# Patient Record
Sex: FEMALE | Race: WHITE | NOT HISPANIC OR LATINO | Employment: FULL TIME | URBAN - NONMETROPOLITAN AREA
[De-identification: names, ages, dates, MRNs, and addresses within clinical notes are randomized per-mention and may not be internally consistent; named-entity substitution may affect disease eponyms.]

---

## 2017-01-04 ENCOUNTER — OFFICE VISIT (OUTPATIENT)
Dept: PULMONOLOGY | Facility: CLINIC | Age: 52
End: 2017-01-04

## 2017-01-04 ENCOUNTER — HOSPITAL ENCOUNTER (OUTPATIENT)
Dept: LAB | Facility: HOSPITAL | Age: 52
Discharge: HOME | End: 2017-01-04
Attending: INTERNAL MEDICINE

## 2017-01-04 VITALS
OXYGEN SATURATION: 95 % | BODY MASS INDEX: 31.02 KG/M2 | HEART RATE: 70 BPM | SYSTOLIC BLOOD PRESSURE: 118 MMHG | DIASTOLIC BLOOD PRESSURE: 66 MMHG | RESPIRATION RATE: 18 BRPM | WEIGHT: 158 LBS | HEIGHT: 60 IN

## 2017-01-04 DIAGNOSIS — R06.02 SHORTNESS OF BREATH: Primary | ICD-10-CM

## 2017-01-04 DIAGNOSIS — J42 CHRONIC BRONCHITIS, UNSPECIFIED CHRONIC BRONCHITIS TYPE (HCC): ICD-10-CM

## 2017-01-04 DIAGNOSIS — G47.33 OBSTRUCTIVE SLEEP APNEA: ICD-10-CM

## 2017-01-04 DIAGNOSIS — J30.89 OTHER ALLERGIC RHINITIS: ICD-10-CM

## 2017-01-04 DIAGNOSIS — J44.9 CHRONIC OBSTRUCTIVE PULMONARY DISEASE, UNSPECIFIED COPD TYPE (HCC): ICD-10-CM

## 2017-01-04 DIAGNOSIS — R93.89 ABNORMAL CHEST X-RAY: ICD-10-CM

## 2017-01-04 PROCEDURE — 99245 OFF/OP CONSLTJ NEW/EST HI 55: CPT | Performed by: INTERNAL MEDICINE

## 2017-01-04 RX ORDER — FLUNISOLIDE 0.25 MG/ML
1 SOLUTION NASAL EVERY 12 HOURS
Qty: 1 BOTTLE | Refills: 5 | Status: SHIPPED | OUTPATIENT
Start: 2017-01-04 | End: 2017-08-09 | Stop reason: SDUPTHER

## 2017-01-04 RX ORDER — OMEPRAZOLE 20 MG/1
CAPSULE, DELAYED RELEASE ORAL
Refills: 0 | COMMUNITY
Start: 2016-12-28 | End: 2018-01-22

## 2017-01-04 RX ORDER — HYDROCODONE BITARTRATE AND ACETAMINOPHEN 7.5; 325 MG/1; MG/1
TABLET ORAL
Refills: 0 | COMMUNITY
Start: 2016-12-28 | End: 2018-01-22 | Stop reason: SDUPTHER

## 2017-01-04 RX ORDER — LEVOTHYROXINE SODIUM 0.07 MG/1
TABLET ORAL
Refills: 0 | COMMUNITY
Start: 2016-12-28 | End: 2020-09-09 | Stop reason: DRUGHIGH

## 2017-01-04 RX ORDER — GABAPENTIN 600 MG/1
TABLET ORAL
Refills: 0 | COMMUNITY
Start: 2016-12-29 | End: 2018-01-22

## 2017-01-04 RX ORDER — ALBUTEROL SULFATE 2.5 MG/3ML
SOLUTION RESPIRATORY (INHALATION)
COMMUNITY
Start: 2014-02-19

## 2017-01-04 RX ORDER — SIMVASTATIN 20 MG
20 TABLET ORAL NIGHTLY
Refills: 0 | COMMUNITY
Start: 2016-12-28

## 2017-01-04 RX ORDER — ACLIDINIUM BROMIDE 400 UG/1
POWDER, METERED RESPIRATORY (INHALATION)
Refills: 0 | COMMUNITY
Start: 2016-12-28 | End: 2017-01-04 | Stop reason: SDUPTHER

## 2017-01-04 RX ORDER — MOMETASONE FUROATE AND FORMOTEROL FUMARATE DIHYDRATE 200; 5 UG/1; UG/1
AEROSOL RESPIRATORY (INHALATION)
Refills: 0 | COMMUNITY
Start: 2016-11-06 | End: 2018-01-22

## 2017-01-04 RX ORDER — ACLIDINIUM BROMIDE 400 UG/1
1 POWDER, METERED RESPIRATORY (INHALATION)
Qty: 1 EACH | Refills: 5 | Status: SHIPPED | OUTPATIENT
Start: 2017-01-04 | End: 2017-09-19

## 2017-01-04 NOTE — MR AVS SNAPSHOT
Sarita Horan   1/4/2017 10:15 AM   Office Visit    Dept Phone:  [phone]   Encounter #:  [id number]    Provider:  Bianca Huang MD   Department:  [facility] PULMONARY CRITICAL CARE AND SLEEP                Your Full Care Plan              Today's Medication Changes          These changes are accurate as of: 1/4/17 10:50 AM.  If you have any questions, ask your nurse or doctor.               New Medication(s)Ordered:     flunisolide 25 MCG/ACT (0.025%) solution nasal spray   Commonly known as:  NASALIDE   Inhale 1 spray Every 12 (Twelve) Hours.   Started by:  Bianca Huang MD         Medication(s)that have changed:     TUDORZA PRESSAIR 400 MCG/ACT aerosol powder  powder for inhalation   Generic drug:  aclidinium bromide   Inhale 1 puff 2 (Two) Times a Day.   What changed:  See the new instructions.   Changed by:  Bianca Huang MD            Where to Get Your Medications      These medications were sent to [facility] [place] [address] - [address] [place] [address] - [phone]  - [phone] [address] [address]     Phone:  [phone]     flunisolide 25 MCG/ACT (0.025%) solution nasal spray    TUDORZA PRESSAIR 400 MCG/ACT aerosol powder  powder for inhalation                  Your Updated Medication List          This list is accurate as of: 1/4/17 10:50 AM.  Always use your most recent med list.                * albuterol (2.5 MG/3ML) 0.083% nebulizer solution   Commonly known as:  PROVENTIL       * PROAIR  (90 BASE) MCG/ACT inhaler   Generic drug:  albuterol       DULERA 200-5 MCG/ACT inhaler   Generic drug:  mometasone-formoterol       flunisolide 25 MCG/ACT (0.025%) solution nasal spray   Commonly known as:  NASALIDE   Inhale 1 spray Every 12 (Twelve) Hours.       gabapentin 600 MG tablet   Commonly known as:  NEURONTIN       HYDROcodone-acetaminophen 7.5-325 MG per tablet   Commonly known as:  NORCO       levothyroxine 75 MCG tablet   Commonly known as:  SYNTHROID, LEVOTHROID       metFORMIN 500 MG tablet   Commonly known as:  GLUCOPHAGE       omeprazole 20 MG capsule   Commonly known as:  priLOSEC       simvastatin 20 MG tablet   Commonly known as:  ZOCOR       TUDORZA PRESSAIR 400 MCG/ACT aerosol powder  powder for inhalation   Generic drug:  aclidinium bromide   Inhale 1 puff 2 (Two) Times a Day.       * Notice:  This list has 2 medication(s) that are the same as other medications prescribed for you. Read the directions carefully, and ask your doctor or other care provider to review them with you.            You Were Diagnosed With        Codes Comments    Shortness of breath    -  Primary ICD-10-CM: R06.02  ICD-9-CM: 786.05     Chronic obstructive pulmonary disease, unspecified COPD type     ICD-10-CM: J44.9  ICD-9-CM: 496     Chronic bronchitis, unspecified chronic bronchitis type     ICD-10-CM: J42  ICD-9-CM: 491.9     Abnormal chest x-ray     ICD-10-CM: R93.8  ICD-9-CM: 793.2     Obstructive sleep apnea     ICD-10-CM: G47.33  ICD-9-CM: 327.23     Other allergic rhinitis     ICD-10-CM: J30.89  ICD-9-CM: 477.8       Instructions     None    Patient Instructions History      Upcoming Appointments     Visit Type Date Time Department    NEW PATIENT 2017 10:15 AM [facility]    FOLLOW UP 3/16/2017 11:15 AM [facility]      eGym Signup     [facility] eGym allows you to send messages to your doctor, view your test results, renew your prescriptions, schedule appointments, and more. To sign up, go to "[employer] and click on the Sign Up Now link in the New User? box. Enter your eGym Activation Code exactly as it appears below along with the last four digits of your Social Security Number and your Date of Birth () to complete the sign-up process. If you do not sign up before the expiration date, you must request a new code.    eGym Activation Code:  [email or web address]  Expires: 1/18/2017 10:50 AM    If you have questions, you can email [email or web address] or call [phone] to talk to our [employer] staff. Remember, [employer] is NOT to be used for urgent needs. For medical emergencies, dial 911.               Other Info from Your Visit           Your Appointments     Mar 16, 2017 11:15 AM EDT   Follow Up with Bianca Huang MD   [facility] PULMONARY [facility]ITICAL [facility]ARE AND SLEEP (--)    [address] [facility] [address]  [facility] [address] [phone]           Arrive 15 minutes prior to appointment.              Allergies     Ibuprofen      Rocephin [Ceftriaxone]        Reason for Visit     COPD     Lung Nodule POSSIBLY      Vital Signs     Blood Pressure Pulse Respirations Height Weight Oxygen Saturation    118/66 70 18 60\" (152.4 cm) 158 lb (71.7 kg) 95%    Body Mass Index Smoking Status                30.86 kg/m2 Former Smoker          Problems and Diagnoses Noted     Shortness of breath    -  Primary    Chronic airway obstruction        Chronic bronchitis        Abnormal chest x-ray        Sleep apnea        Other allergic rhinitis            "

## 2017-01-04 NOTE — LETTER
January 8, 2017     LEX Diallo  [address]  [facility] [place] [address]    Patient: Sarita Horan   YOB: 1965   Date of Visit: 1/4/2017       Dear LEX Campos:    Sarita Horan was in my office today. Below is a copy of my note.    If you have questions, please do not hesitate to call me. I look forward to following Sarita along with you.         Sincerely,        Bianca Huang MD        CC: No Recipients    CONSULT NOTE    Chief Complaint   Patient presents with   • COPD   • Lung Nodule     POSSIBLY       Subjective   Sarita Horan is a 51 y.o. female.     History of Present Illness   Patient comes in today for evaluation of shortness of breath. Patient says that for the past few years, she has been noticing that her exercise tolerance has been declining. She has had days when walking any significant distance is difficult to do without stopping in the middle, to catch her breath.     Patient also complains of some cough and phlegm production that occurs on most mornings out of the week, for most weeks out of the month, for the past few years. Patient used to smoke 1 pack per day for the past 34 years and is not smoking at the current time, having quit 3 weeks ago.     She does have a family history of chronic obstructive disease in her father.  She says that both her parents smoked inside the house.  She also reports having asthma as a child    She does have a pet dog at home    Patient also complains of runny nose and dribbling in the back of the throat for the past few months. She is currently on Tudorza once a day, Dulera twice a day and still requires the pro-air at least twice a day as well    Upon questioning he is also complaining of sleep disturbance. Patient says that for the past few years, she has had trouble with snoring. Patient says that she feels tired in the morning after waking up.  She is complaining of occasional headaches.    She actually was diagnosed with  "obstructive sleep apnea but her CPAP device has malfunctioned and she has been unable to use CPAP device on a regular basis      The following portions of the patient's history were reviewed and updated as appropriate: allergies, current medications, past family history, past medical history, past social history and past surgical history.    Review of Systems   HENT: Positive for voice change.    Respiratory: Positive for cough, shortness of breath and wheezing.    Psychiatric/Behavioral: Positive for sleep disturbance.   All other systems reviewed and are negative.      Past Medical History   Diagnosis Date   • Asthma    • Chronic bronchitis    • COPD (chronic obstructive pulmonary disease)    • Hyperlipidemia    • Pneumonia    • Sleep apnea, obstructive        Social History   Substance Use Topics   • Smoking status: Former Smoker     Packs/day: 1.00     Years: 34.00     Quit date: 12/14/2016   • Smokeless tobacco: Not on file   • Alcohol use No         Objective   Visit Vitals   • /66   • Pulse 70   • Resp 18   • Ht 60\" (152.4 cm)   • Wt 158 lb (71.7 kg)   • SpO2 95%   • BMI 30.86 kg/m2     Physical Exam  Constitutional:           General: No acute distress noted. Able to communicate appropriately           Body Habitus: Obese.    Head/Face/Eyes:            Atraumatic. Normocephalic. No significant facial abnormalities seen.            Extra ocular movement was intact.            Pupils appeared equal            Conjunctivae: Normal     ENT:            Sinuses were non tender on palpation.            Nostril showed moderate erythema.            Oropharynx was cobblestoned.            Mallampati score was 3/4            Tonsils were enlarged     Neck:           Increased adipose tissue noted. No Jugular Venous Distention appreciated.     Cardiovascular:            S1 + S2. Regular.    Respiratory:           Respiratory effort was adequate           Percussion was normal           Mild scattered wheezing was " auscultated.    Musculoskeletal/Extremities:             Normal Gait.              No clubbing, cyanosis noted in the upper extremities.             No edema noted in the lower extremities bilaterally.    Neurologic/Psychiatric:             Affect appeared fair.                Awake, alert and oriented x 3.             Able to follow simple commands.    Skin:             No obvious rash noted.             Warm and dry      Assessment/Plan   Sarita was seen today for copd and lung nodule.    Diagnoses and all orders for this visit:    Shortness of breath  -     XR Chest PA & Lateral; Future    Chronic obstructive pulmonary disease, unspecified COPD type  -     Alpha - 1 - Antitrypsin Phenotype; Future  -     XR Chest PA & Lateral; Future  -     Pulmonary Function Test; Future    Chronic bronchitis, unspecified chronic bronchitis type    Abnormal chest x-ray    Obstructive sleep apnea  -     Polysomnography 4 or More Parameters; Future    Other allergic rhinitis  -     IgE; Future  -     Allergens, Zone 8; Future    Other orders  -     TUDORZA PRESSAIR 400 MCG/ACT aerosol powder  powder for inhalation; Inhale 1 puff 2 (Two) Times a Day.  -     flunisolide (NASALIDE) 25 MCG/ACT (0.025%) solution nasal spray; Inhale 1 spray Every 12 (Twelve) Hours.           Return in about 2 months (around 3/4/2017) for Sleep study, PFTs, Labs, Imaging study, Give pt sleep questionairre.    DISCUSSION(if any):  I have reviewed the patient's imaging studies and shared them with her.  The images were reviewed with the patient and it does suggest possible right middle lobe/perihilar scarring/atelectasis.    I have also reviewed her last primary care provider's office note that mentions upper respiratory tract infection, acute bronchitis and COPD.  And also mentions that the sputum culture was ordered     Laboratory workup was also reviewed which showed last hemoglobin of around 11    ================================   ================================    I will try to obtain the patient's last sleep study but since she has not used the CPAP device in many months, and the sleep study was done a few years ago, the only real alternative would be to order a split night study and this will be requested as soon as possible.    We will encourage the patient to schedule the sleep study soon.     Patient was educated on good sleep hygiene measures and voiced understanding of the same.     Some of her issues with CPAP earlier, could be due to the fact that she likely has underlying COPD which may have made exhaling against the CPAP difficult.  I have told her that if that occurs, then we will definitely suggest a trial of BiPAP    She was asked to use Tudorza twice a day and not once a day    Patient was advised to use albuterol based rescue inhaler for when necessary purposes    Patient was also advised to keep a log of the use of rescue inhaler.    She is congratulated on the fact that she quit smoking and have advised her to stay quit    Patient was given reading material regarding sleep apnea, smoking cessation and COPD    Alpha-1 antitrypsin levels, PFTs, chest x-ray and IgE/RAST panel has been ordered    Nasal steroids have been initiated, due to presence of allergic rhinitis        Errors in dictation may reflect use of voice recognition software and not all errors in transcription may have been detected prior to signing    This document was electronically signed by Bianca Huang MD January 4, 2017  10:47 AM

## 2017-01-04 NOTE — PROGRESS NOTES
CONSULT NOTE    Chief Complaint   Patient presents with   • COPD   • Lung Nodule     POSSIBLY       Subjective   Sarita Myra Horan is a 51 y.o. female.     History of Present Illness   Patient comes in today for evaluation of shortness of breath. Patient says that for the past few years, she has been noticing that her exercise tolerance has been declining. She has had days when walking any significant distance is difficult to do without stopping in the middle, to catch her breath.     Patient also complains of some cough and phlegm production that occurs on most mornings out of the week, for most weeks out of the month, for the past few years. Patient used to smoke 1 pack per day for the past 34 years and is not smoking at the current time, having quit 3 weeks ago.     She does have a family history of chronic obstructive disease in her father.  She says that both her parents smoked inside the house.  She also reports having asthma as a child    She does have a pet dog at home    Patient also complains of runny nose and dribbling in the back of the throat for the past few months. She is currently on Tudorza once a day, Dulera twice a day and still requires the pro-air at least twice a day as well    Upon questioning he is also complaining of sleep disturbance. Patient says that for the past few years, she has had trouble with snoring. Patient says that she feels tired in the morning after waking up.  She is complaining of occasional headaches.    She actually was diagnosed with obstructive sleep apnea but her CPAP device has malfunctioned and she has been unable to use CPAP device on a regular basis      The following portions of the patient's history were reviewed and updated as appropriate: allergies, current medications, past family history, past medical history, past social history and past surgical history.    Review of Systems   HENT: Positive for voice change.    Respiratory: Positive for cough, shortness of  "breath and wheezing.    Psychiatric/Behavioral: Positive for sleep disturbance.   All other systems reviewed and are negative.      Past Medical History   Diagnosis Date   • Asthma    • Chronic bronchitis    • COPD (chronic obstructive pulmonary disease)    • Hyperlipidemia    • Pneumonia    • Sleep apnea, obstructive        Social History   Substance Use Topics   • Smoking status: Former Smoker     Packs/day: 1.00     Years: 34.00     Quit date: 12/14/2016   • Smokeless tobacco: Not on file   • Alcohol use No         Objective   Visit Vitals   • /66   • Pulse 70   • Resp 18   • Ht 60\" (152.4 cm)   • Wt 158 lb (71.7 kg)   • SpO2 95%   • BMI 30.86 kg/m2     Physical Exam  Constitutional:           General: No acute distress noted. Able to communicate appropriately           Body Habitus: Obese.    Head/Face/Eyes:            Atraumatic. Normocephalic. No significant facial abnormalities seen.            Extra ocular movement was intact.            Pupils appeared equal            Conjunctivae: Normal     ENT:            Sinuses were non tender on palpation.            Nostril showed moderate erythema.            Oropharynx was cobblestoned.            Mallampati score was 3/4            Tonsils were enlarged     Neck:           Increased adipose tissue noted. No Jugular Venous Distention appreciated.     Cardiovascular:            S1 + S2. Regular.    Respiratory:           Respiratory effort was adequate           Percussion was normal           Mild scattered wheezing was auscultated.    Musculoskeletal/Extremities:             Normal Gait.              No clubbing, cyanosis noted in the upper extremities.             No edema noted in the lower extremities bilaterally.    Neurologic/Psychiatric:             Affect appeared fair.                Awake, alert and oriented x 3.             Able to follow simple commands.    Skin:             No obvious rash noted.             Warm and dry      Assessment/Plan "   Sarita was seen today for copd and lung nodule.    Diagnoses and all orders for this visit:    Shortness of breath  -     XR Chest PA & Lateral; Future    Chronic obstructive pulmonary disease, unspecified COPD type  -     Alpha - 1 - Antitrypsin Phenotype; Future  -     XR Chest PA & Lateral; Future  -     Pulmonary Function Test; Future    Chronic bronchitis, unspecified chronic bronchitis type    Abnormal chest x-ray    Obstructive sleep apnea  -     Polysomnography 4 or More Parameters; Future    Other allergic rhinitis  -     IgE; Future  -     Allergens, Zone 8; Future    Other orders  -     TUDORZA PRESSAIR 400 MCG/ACT aerosol powder  powder for inhalation; Inhale 1 puff 2 (Two) Times a Day.  -     flunisolide (NASALIDE) 25 MCG/ACT (0.025%) solution nasal spray; Inhale 1 spray Every 12 (Twelve) Hours.           Return in about 2 months (around 3/4/2017) for Sleep study, PFTs, Labs, Imaging study, Give pt sleep questionairre.    DISCUSSION(if any):  I have reviewed the patient's imaging studies and shared them with her.  The images were reviewed with the patient and it does suggest possible right middle lobe/perihilar scarring/atelectasis.    I have also reviewed her last primary care provider's office note that mentions upper respiratory tract infection, acute bronchitis and COPD.  And also mentions that the sputum culture was ordered     Laboratory workup was also reviewed which showed last hemoglobin of around 11    ================================  ================================    I will try to obtain the patient's last sleep study but since she has not used the CPAP device in many months, and the sleep study was done a few years ago, the only real alternative would be to order a split night study and this will be requested as soon as possible.    We will encourage the patient to schedule the sleep study soon.     Patient was educated on good sleep hygiene measures and voiced understanding of the  same.     Some of her issues with CPAP earlier, could be due to the fact that she likely has underlying COPD which may have made exhaling against the CPAP difficult.  I have told her that if that occurs, then we will definitely suggest a trial of BiPAP    She was asked to use Tudorza twice a day and not once a day    Patient was advised to use albuterol based rescue inhaler for when necessary purposes    Patient was also advised to keep a log of the use of rescue inhaler.    She is congratulated on the fact that she quit smoking and have advised her to stay quit    Patient was given reading material regarding sleep apnea, smoking cessation and COPD    Alpha-1 antitrypsin levels, PFTs, chest x-ray and IgE/RAST panel has been ordered    Nasal steroids have been initiated, due to presence of allergic rhinitis        Errors in dictation may reflect use of voice recognition software and not all errors in transcription may have been detected prior to signing    This document was electronically signed by Bianca Huang MD January 4, 2017  10:47 AM

## 2017-01-09 LAB
A ALTERNATA IGE QN: <0.1 KU/L
A FUMIGATUS IGE QN: <0.1 KU/L
A1AT SERPL-MCNC: 151 MG/DL (ref 90–200)
AMER ROACH IGE QN: 0.14 KU/L
BAHIA GRASS IGE QN: <0.1 KU/L
BERMUDA GRASS IGE QN: <0.1 KU/L
BOXELDER IGE QN: <0.1 KU/L
C HERBARUM IGE QN: <0.1 KU/L
CAT DANDER IGE QN: <0.1 KU/L
CMN PIGWEED IGE QN: <0.1 KU/L
COMMON RAGWEED IGE QN: <0.1 KU/L
D FARINAE IGE QN: <0.1 KU/L
D PTERONYSS IGE QN: <0.1 KU/L
DOG DANDER IGE QN: <0.1 KU/L
ENGL PLANTAIN IGE QN: <0.1 KU/L
HAZELNUT POLN IGE QN: <0.1 KU/L
JOHNSON GRASS IGE QN: <0.1 KU/L
KENT BLUE GRASS IGE QN: <0.1 KU/L
Lab: ABNORMAL
M RACEMOSUS IGE QN: <0.1 KU/L
MT JUNIPER IGE QN: <0.1 KU/L
MUGWORT IGE QN: <0.1 KU/L
NETTLE IGE QN: <0.1 KU/L
P NOTATUM IGE QN: <0.1 KU/L
S BOTRYOSUM IGE QN: <0.1 KU/L
SERPINA1 GENE MUT ANL BLD/T: NORMAL
SHEEP SORREL IGE QN: <0.1 KU/L
SWEET GUM IGE QN: <0.1 KU/L
T011-IGE MAPLE LEAF SYCAMORE: <0.1 KU/L
TOTAL IGE SMQN RAST: 26 IU/ML (ref 0–100)
WHITE ELM IGE QN: <0.1 KU/L
WHITE HICKORY IGE QN: <0.1 KU/L
WHITE MULBERRY IGE QN: <0.1 KU/L
WHITE OAK IGE QN: <0.1 KU/L

## 2017-01-27 ENCOUNTER — APPOINTMENT (OUTPATIENT)
Dept: SLEEP MEDICINE | Facility: HOSPITAL | Age: 52
End: 2017-01-27

## 2017-02-24 ENCOUNTER — HOSPITAL ENCOUNTER (OUTPATIENT)
Dept: SLEEP MEDICINE | Facility: HOSPITAL | Age: 52
Discharge: HOME | End: 2017-02-24
Attending: INTERNAL MEDICINE | Admitting: INTERNAL MEDICINE

## 2017-02-24 DIAGNOSIS — G47.33 OBSTRUCTIVE SLEEP APNEA: ICD-10-CM

## 2017-02-24 PROCEDURE — 95810 POLYSOM 6/> YRS 4/> PARAM: CPT | Performed by: INTERNAL MEDICINE

## 2017-02-28 DIAGNOSIS — J44.9 CHRONIC OBSTRUCTIVE PULMONARY DISEASE, UNSPECIFIED COPD TYPE (HCC): Primary | ICD-10-CM

## 2017-03-01 DIAGNOSIS — R09.02 HYPOXIA: Primary | ICD-10-CM

## 2017-03-14 ENCOUNTER — HOSPITAL ENCOUNTER (OUTPATIENT)
Dept: GENERAL RADIOLOGY | Facility: HOSPITAL | Age: 52
Discharge: HOME | End: 2017-03-14
Attending: INTERNAL MEDICINE | Admitting: INTERNAL MEDICINE

## 2017-03-14 DIAGNOSIS — J44.9 CHRONIC OBSTRUCTIVE PULMONARY DISEASE, UNSPECIFIED COPD TYPE (HCC): ICD-10-CM

## 2017-03-14 DIAGNOSIS — R06.02 SHORTNESS OF BREATH: ICD-10-CM

## 2017-03-14 PROCEDURE — 71020 HC CHEST PA AND LATERAL: CPT

## 2017-03-16 ENCOUNTER — OFFICE VISIT (OUTPATIENT)
Dept: PULMONOLOGY | Facility: CLINIC | Age: 52
End: 2017-03-16

## 2017-03-16 VITALS
OXYGEN SATURATION: 96 % | WEIGHT: 153.5 LBS | HEART RATE: 72 BPM | BODY MASS INDEX: 30.14 KG/M2 | HEIGHT: 60 IN | DIASTOLIC BLOOD PRESSURE: 76 MMHG | RESPIRATION RATE: 18 BRPM | SYSTOLIC BLOOD PRESSURE: 130 MMHG

## 2017-03-16 DIAGNOSIS — J44.1 ACUTE EXACERBATION OF CHRONIC OBSTRUCTIVE PULMONARY DISEASE (COPD) (HCC): ICD-10-CM

## 2017-03-16 DIAGNOSIS — R93.89 ABNORMAL CHEST X-RAY: ICD-10-CM

## 2017-03-16 DIAGNOSIS — R06.02 SHORTNESS OF BREATH: Primary | ICD-10-CM

## 2017-03-16 DIAGNOSIS — J30.89 OTHER ALLERGIC RHINITIS: ICD-10-CM

## 2017-03-16 DIAGNOSIS — J42 CHRONIC BRONCHITIS, UNSPECIFIED CHRONIC BRONCHITIS TYPE (HCC): ICD-10-CM

## 2017-03-16 DIAGNOSIS — J44.9 CHRONIC OBSTRUCTIVE PULMONARY DISEASE, UNSPECIFIED COPD TYPE (HCC): ICD-10-CM

## 2017-03-16 PROCEDURE — 96372 THER/PROPH/DIAG INJ SC/IM: CPT | Performed by: INTERNAL MEDICINE

## 2017-03-16 PROCEDURE — 99214 OFFICE O/P EST MOD 30 MIN: CPT | Performed by: INTERNAL MEDICINE

## 2017-03-16 RX ORDER — METHYLPREDNISOLONE ACETATE 80 MG/ML
80 INJECTION, SUSPENSION INTRA-ARTICULAR; INTRALESIONAL; INTRAMUSCULAR; SOFT TISSUE ONCE
Status: COMPLETED | OUTPATIENT
Start: 2017-03-16 | End: 2017-03-16

## 2017-03-16 RX ADMIN — METHYLPREDNISOLONE ACETATE 80 MG: 80 INJECTION, SUSPENSION INTRA-ARTICULAR; INTRALESIONAL; INTRAMUSCULAR; SOFT TISSUE at 11:42

## 2017-03-16 NOTE — PROGRESS NOTES
"Chief Complaint   Patient presents with   • Follow-up   • Asthma   • COPD   • Sleep Apnea         Subjective   Sarita Myra Horan is a 51 y.o. female.     History of Present Illness     The patient comes in today complaining of shortness of breath, wheezing, cough and phlegm production which has been worse for the past few days. Patient is not complaining of subjective chills.     She is using rescue inhaler/nebulizer more, 3-4 times a day, than usual with some relief.    She continues to use Tudorza BID and Dulera once a day instead of twice a day which is how it is prescribed.     She is using Nasalide bid.     She quit smoking 2 months ago and has not started smoking again.     She continues to complain to tiredness in the day and not able to sleep all night. She wakes nearly every hour at night. She has some anxiety and worries about things.     The following portions of the patient's history were reviewed and updated as appropriate: allergies, current medications, past family history, past medical history, past social history and past surgical history.    Review of Systems   Constitutional: Negative.    HENT: Positive for postnasal drip and rhinorrhea.    Respiratory: Positive for cough and wheezing.    Cardiovascular: Negative.    Psychiatric/Behavioral: Positive for sleep disturbance. The patient is nervous/anxious.        Objective   Visit Vitals   • /76   • Pulse 72   • Resp 18   • Ht 60\" (152.4 cm)   • Wt 153 lb 8 oz (69.6 kg)   • SpO2 96%   • BMI 29.98 kg/m2     Physical Exam   Constitutional: She is oriented to person, place, and time. She appears well-developed.   HENT:   Head: Normocephalic and atraumatic.   Mild drainage noted  Mallempati III/IV   Eyes: EOM are normal.   Cardiovascular: Normal rate and regular rhythm.    Pulmonary/Chest: Effort normal. She has wheezes.   Musculoskeletal:   Gait normal.   Neurological: She is alert and oriented to person, place, and time.   Skin: Skin is dry. "   Psychiatric: She has a normal mood and affect.   Vitals reviewed.          Assessment/Plan   Sarita was seen today for follow-up, asthma, copd and sleep apnea.    Diagnoses and all orders for this visit:    Shortness of breath  -     Pulmonary Function Test; Future    Chronic obstructive pulmonary disease, unspecified COPD type  -     Overnight Sleep Oximetry Study; Future  -     Pulmonary Function Test; Future    Chronic bronchitis, unspecified chronic bronchitis type    Other allergic rhinitis    Acute exacerbation of chronic obstructive pulmonary disease (COPD)  -     methylPREDNISolone acetate (DEPO-medrol) injection 80 mg; Inject 1 mL into the shoulder, thigh, or buttocks 1 (One) Time.    Abnormal chest x-ray  Comments:  Resolved.         Return in about 3 months (around 6/16/2017) for Recheck, For Phyllis.    DISCUSSION (if any):  I reviewed her chest xray which was normal.     I reviewed and discussed sleep study results with her. AHI was normal.   The patient is on medications which can cause sleepiness including Norco and neurontin.     She will discuss anxiety and possible medications for anxiety with her PCP when she sees them next week.     IgE is normal.    Since she is sick today we will reschedule PFT's.     For the symptoms of acute exacerbation of chronic bronchitis, I have prescribed intramuscular steroids.    Patient will be prescribed antibiotics, if the patient develops any fever.    Side effects have been discussed in detail.    Patient was asked to report to the emergency room if symptoms do not improve.    Continue Dulera 2 puffs BID instead of once a day. Continue Tudorza and nasal spray.     I will request an overnight pulse oximetry to evaluate her oxygen at night.         Errors in dictation may reflect use of voice recognition software and not all errors in transcription may have been detected prior to signing    This document was electronically signed by Bianca Huang MD March 16,  2017  11:35 AM

## 2017-04-11 ENCOUNTER — TELEPHONE (OUTPATIENT)
Dept: PULMONOLOGY | Facility: CLINIC | Age: 52
End: 2017-04-11

## 2017-04-20 ENCOUNTER — TELEPHONE (OUTPATIENT)
Dept: PULMONOLOGY | Facility: CLINIC | Age: 52
End: 2017-04-20

## 2017-04-20 NOTE — TELEPHONE ENCOUNTER
Called and spoke to patient told her that we are going to start her on oxygen at night at 3LPM. Sending order to ezra

## 2017-06-28 ENCOUNTER — APPOINTMENT (OUTPATIENT)
Dept: ULTRASOUND IMAGING | Facility: HOSPITAL | Age: 52
End: 2017-06-28

## 2017-06-28 ENCOUNTER — HOSPITAL ENCOUNTER (EMERGENCY)
Facility: HOSPITAL | Age: 52
Discharge: HOME | End: 2017-06-28
Attending: EMERGENCY MEDICINE | Admitting: EMERGENCY MEDICINE

## 2017-06-28 VITALS
OXYGEN SATURATION: 97 % | TEMPERATURE: 97.9 F | HEART RATE: 72 BPM | RESPIRATION RATE: 18 BRPM | HEIGHT: 60 IN | SYSTOLIC BLOOD PRESSURE: 138 MMHG | WEIGHT: 146 LBS | DIASTOLIC BLOOD PRESSURE: 84 MMHG | BODY MASS INDEX: 28.66 KG/M2

## 2017-06-28 DIAGNOSIS — R60.0 EDEMA OF RIGHT LOWER EXTREMITY: Primary | ICD-10-CM

## 2017-06-28 PROCEDURE — 93971 EXTREMITY STUDY: CPT

## 2017-06-28 PROCEDURE — 99282 EMERGENCY DEPT VISIT SF MDM: CPT

## 2017-07-14 ENCOUNTER — HOSPITAL ENCOUNTER (OUTPATIENT)
Dept: GENERAL RADIOLOGY | Facility: HOSPITAL | Age: 52
Discharge: HOME | End: 2017-07-14
Admitting: NURSE PRACTITIONER

## 2017-07-14 ENCOUNTER — TRANSCRIBE ORDERS (OUTPATIENT)
Dept: GENERAL RADIOLOGY | Facility: HOSPITAL | Age: 52
End: 2017-07-14

## 2017-07-14 DIAGNOSIS — R52 PAIN: Primary | ICD-10-CM

## 2017-07-14 DIAGNOSIS — R52 PAIN: ICD-10-CM

## 2017-07-14 PROCEDURE — 73110 X-RAY EXAM OF WRIST: CPT

## 2017-08-09 RX ORDER — FLUNISOLIDE 0.25 MG/ML
SOLUTION NASAL
Qty: 25 ML | Refills: 5 | Status: SHIPPED | OUTPATIENT
Start: 2017-08-09 | End: 2018-01-22 | Stop reason: SDUPTHER

## 2017-09-19 ENCOUNTER — OFFICE VISIT (OUTPATIENT)
Dept: PULMONOLOGY | Facility: CLINIC | Age: 52
End: 2017-09-19

## 2017-09-19 VITALS
BODY MASS INDEX: 28.66 KG/M2 | HEART RATE: 69 BPM | HEIGHT: 60 IN | OXYGEN SATURATION: 96 % | RESPIRATION RATE: 16 BRPM | WEIGHT: 146 LBS | SYSTOLIC BLOOD PRESSURE: 98 MMHG | DIASTOLIC BLOOD PRESSURE: 62 MMHG

## 2017-09-19 DIAGNOSIS — J44.9 CHRONIC OBSTRUCTIVE PULMONARY DISEASE, UNSPECIFIED COPD TYPE (HCC): Primary | ICD-10-CM

## 2017-09-19 DIAGNOSIS — G47.34 NOCTURNAL HYPOXIA: ICD-10-CM

## 2017-09-19 DIAGNOSIS — R06.02 SHORTNESS OF BREATH: ICD-10-CM

## 2017-09-19 DIAGNOSIS — J44.9 CHRONIC OBSTRUCTIVE PULMONARY DISEASE, UNSPECIFIED COPD TYPE (HCC): ICD-10-CM

## 2017-09-19 DIAGNOSIS — J30.89 OTHER ALLERGIC RHINITIS: ICD-10-CM

## 2017-09-19 PROCEDURE — 94060 EVALUATION OF WHEEZING: CPT | Performed by: INTERNAL MEDICINE

## 2017-09-19 PROCEDURE — 94726 PLETHYSMOGRAPHY LUNG VOLUMES: CPT | Performed by: INTERNAL MEDICINE

## 2017-09-19 PROCEDURE — 99214 OFFICE O/P EST MOD 30 MIN: CPT | Performed by: NURSE PRACTITIONER

## 2017-09-19 PROCEDURE — 94729 DIFFUSING CAPACITY: CPT | Performed by: INTERNAL MEDICINE

## 2017-09-19 RX ORDER — TIOTROPIUM BROMIDE INHALATION SPRAY 3.12 UG/1
SPRAY, METERED RESPIRATORY (INHALATION)
COMMUNITY
Start: 2017-09-07 | End: 2017-10-03 | Stop reason: SDUPTHER

## 2017-09-19 RX ORDER — GABAPENTIN 800 MG/1
TABLET ORAL
Refills: 0 | COMMUNITY
Start: 2017-09-11

## 2017-09-19 NOTE — PROGRESS NOTES
"Chief Complaint   Patient presents with   • Follow-up   • COPD         Subjective   Sarita Horan is a 52 y.o. female.     History of Present Illness   The patient is here for follow-up of shortness of breath, COPD, hypoxia, and allergic rhinitis.    Tudorza was not covered by her insurance so she was changed to Spiriva by her PCP. She is using Dulera as well. She uses the rescue inhaler twice a day on average and the nebulizer twice a day.     She feels like her shortness of breath is stable. She has not had a decrease in exercise tolerance.      She is using oxygen at 2LPM and she is sleeping better at night.     She feels that Nasalide has helped a great deal.     She continues to smoke 2-3 cigarettes a day.     The following portions of the patient's history were reviewed and updated as appropriate: allergies, current medications, past family history, past medical history, past social history and past surgical history.    Review of Systems   HENT: Negative for sinus pressure, sneezing and sore throat.    Respiratory: Positive for cough and wheezing. Negative for shortness of breath.        Objective   Visit Vitals   • BP 98/62   • Pulse 69   • Resp 16   • Ht 60\" (152.4 cm)   • Wt 146 lb (66.2 kg)   • SpO2 96%   • BMI 28.51 kg/m2     Physical Exam   Constitutional: She is oriented to person, place, and time. She appears well-developed and well-nourished.   HENT:   Head: Atraumatic.   Crowded oropharynx.  Erythema and mild drainage   Tonsils present.   Eyes: EOM are normal.   Neck: Neck supple.   Cardiovascular: Normal rate and regular rhythm.    Pulmonary/Chest: Effort normal. No respiratory distress.   Somewhat hyperresonant to percussion  Somewhat decreased A/E with wheezing noted.   Musculoskeletal: She exhibits no edema.   Neurological: She is alert and oriented to person, place, and time.   Skin: Skin is warm and dry.   Psychiatric: She has a normal mood and affect.   Vitals " reviewed.          Assessment/Plan   Sarita was seen today for follow-up and copd.    Diagnoses and all orders for this visit:    Chronic obstructive pulmonary disease, unspecified COPD type    Shortness of breath    Other allergic rhinitis    Nocturnal hypoxia           Return in about 4 months (around 1/19/2018) for Recheck, For Dr. Huang.    DISCUSSION (if any):  Continue Spiriva and Dulera.     I reviewed her PFT today which shows moderate to severe obstruction.     I encouraged her to use the rescue inhaler as prescribed.    She will need to continue oxygen at night on a regular basis.    Also had a long discussion with her about quitting smoking and encouraged her to consider options.      Dictated utilizing Dragon dictation.    This document was electronically signed by LEX Hawk September 19, 2017  3:53 PM     3

## 2017-10-09 RX ORDER — TIOTROPIUM BROMIDE INHALATION SPRAY 3.12 UG/1
SPRAY, METERED RESPIRATORY (INHALATION)
Qty: 4 INHALER | Refills: 2 | Status: SHIPPED | OUTPATIENT
Start: 2017-10-09 | End: 2018-01-02 | Stop reason: SDUPTHER

## 2018-01-02 RX ORDER — TIOTROPIUM BROMIDE INHALATION SPRAY 3.12 UG/1
SPRAY, METERED RESPIRATORY (INHALATION)
Qty: 4 G | Refills: 2 | Status: SHIPPED | OUTPATIENT
Start: 2018-01-02 | End: 2018-01-22

## 2018-01-22 ENCOUNTER — OFFICE VISIT (OUTPATIENT)
Dept: PULMONOLOGY | Facility: CLINIC | Age: 53
End: 2018-01-22

## 2018-01-22 VITALS
OXYGEN SATURATION: 93 % | BODY MASS INDEX: 29.64 KG/M2 | HEART RATE: 72 BPM | DIASTOLIC BLOOD PRESSURE: 70 MMHG | HEIGHT: 60 IN | SYSTOLIC BLOOD PRESSURE: 109 MMHG | RESPIRATION RATE: 16 BRPM | WEIGHT: 151 LBS

## 2018-01-22 DIAGNOSIS — J44.9 CHRONIC OBSTRUCTIVE PULMONARY DISEASE, UNSPECIFIED COPD TYPE (HCC): ICD-10-CM

## 2018-01-22 DIAGNOSIS — J30.89 OTHER ALLERGIC RHINITIS: ICD-10-CM

## 2018-01-22 DIAGNOSIS — G47.34 NOCTURNAL HYPOXIA: ICD-10-CM

## 2018-01-22 DIAGNOSIS — F17.200 SMOKING: ICD-10-CM

## 2018-01-22 DIAGNOSIS — R06.02 SHORTNESS OF BREATH: Primary | ICD-10-CM

## 2018-01-22 DIAGNOSIS — J45.40 MODERATE PERSISTENT ASTHMA WITHOUT COMPLICATION: ICD-10-CM

## 2018-01-22 PROCEDURE — 95012 NITRIC OXIDE EXP GAS DETER: CPT | Performed by: INTERNAL MEDICINE

## 2018-01-22 PROCEDURE — 99214 OFFICE O/P EST MOD 30 MIN: CPT | Performed by: INTERNAL MEDICINE

## 2018-01-22 RX ORDER — BLOOD SUGAR DIAGNOSTIC
STRIP MISCELLANEOUS
Refills: 1 | COMMUNITY
Start: 2018-01-19

## 2018-01-22 RX ORDER — HYDROCODONE BITARTRATE AND ACETAMINOPHEN 10; 325 MG/1; MG/1
1 TABLET ORAL EVERY 6 HOURS PRN
COMMUNITY
Start: 2018-01-19 | End: 2018-04-23 | Stop reason: ALTCHOICE

## 2018-01-22 RX ORDER — OMEPRAZOLE 40 MG/1
40 CAPSULE, DELAYED RELEASE ORAL DAILY
Refills: 0 | COMMUNITY
Start: 2018-01-19

## 2018-01-22 RX ORDER — ACETAMINOPHEN 500 MG
TABLET ORAL
Refills: 0 | COMMUNITY
Start: 2017-11-20

## 2018-01-22 RX ORDER — NEOMYCIN SULFATE, POLYMYXIN B SULFATE AND HYDROCORTISONE 10; 3.5; 1 MG/ML; MG/ML; [USP'U]/ML
SUSPENSION/ DROPS AURICULAR (OTIC)
Refills: 0 | COMMUNITY
Start: 2017-10-23 | End: 2018-09-04

## 2018-01-22 RX ORDER — CITALOPRAM 10 MG/1
TABLET ORAL
Refills: 0 | COMMUNITY
Start: 2017-12-18 | End: 2018-01-22

## 2018-01-22 RX ORDER — ALBUTEROL SULFATE 1.25 MG/3ML
SOLUTION RESPIRATORY (INHALATION)
Refills: 0 | COMMUNITY
Start: 2017-12-08 | End: 2018-04-23

## 2018-01-22 RX ORDER — SUMATRIPTAN 25 MG/1
TABLET, FILM COATED ORAL
Refills: 0 | COMMUNITY
Start: 2018-01-19

## 2018-01-22 RX ORDER — AMOXICILLIN AND CLAVULANATE POTASSIUM 875; 125 MG/1; MG/1
TABLET, FILM COATED ORAL
Refills: 0 | COMMUNITY
Start: 2017-12-08 | End: 2018-01-22

## 2018-01-22 RX ORDER — PREDNISONE 10 MG/1
TABLET ORAL
Refills: 0 | COMMUNITY
Start: 2017-12-18 | End: 2018-09-04

## 2018-01-22 RX ORDER — FLUNISOLIDE 0.25 MG/ML
1 SOLUTION NASAL EVERY 12 HOURS
Qty: 1 BOTTLE | Refills: 5 | Status: SHIPPED | OUTPATIENT
Start: 2018-01-22 | End: 2021-08-25 | Stop reason: SDUPTHER

## 2018-01-22 RX ORDER — IBUPROFEN 800 MG/1
TABLET ORAL
Refills: 0 | COMMUNITY
Start: 2017-11-21 | End: 2018-01-22

## 2018-01-22 NOTE — PROGRESS NOTES
"Chief Complaint   Patient presents with   • Follow-up   • Shortness of Breath         Subjective   Sarita Myra Horan is a 52 y.o. female.     History of Present Illness   Patient comes in today to follow-up and shortness of breath, COPD and allergic rhinitis.    She continues to use oxygen at night without any significant issues.    The patient is reporting some increased nasal congestion and upon questioning says that she has not been using her nasal steroids as prescribed.    The patient continues to smoke 3 cigarettes per day.    She is now on Spiriva and anoro (started by her primary care provider).  The patient requires rescue inhaler once or twice a week or less.  She occasionally uses the nebulizer device as well but this also happens less than once a week.    The patient is complaining of cough and phlegm production.  She also complains of occasional wheezing.    The following portions of the patient's history were reviewed and updated as appropriate: allergies, current medications, past family history, past medical history, past social history and past surgical history.    Review of Systems   Constitutional: Negative for chills and fever.   HENT: Positive for postnasal drip and rhinorrhea. Negative for sinus pressure, sneezing and sore throat.    Respiratory: Positive for cough. Negative for chest tightness and shortness of breath.        Objective   Visit Vitals   • /70   • Pulse 72   • Resp 16   • Ht 152.4 cm (60\")   • Wt 68.5 kg (151 lb)   • SpO2 93%   • BMI 29.49 kg/m2       Physical Exam   Constitutional: She is oriented to person, place, and time. She appears well-developed and well-nourished.   HENT:   Head: Normocephalic and atraumatic.   Eyes: EOM are normal.   Neck: Neck supple.   Cardiovascular: Normal rate and regular rhythm.    Pulmonary/Chest: Effort normal. No respiratory distress.   Somewhat hyperresonant to percussion  Somewhat decreased A/E with out wheezing noted. "   Musculoskeletal: She exhibits no edema.   Neurological: She is alert and oriented to person, place, and time.   Skin: Skin is warm and dry.   Psychiatric: She has a normal mood and affect.   Vitals reviewed.        Assessment/Plan   Sarita was seen today for follow-up and shortness of breath.    Diagnoses and all orders for this visit:    Shortness of breath  -     Nitric Oxide    Chronic obstructive pulmonary disease, unspecified COPD type    Other allergic rhinitis    Nocturnal hypoxia    Moderate persistent asthma without complication  -     Nitric Oxide    Other orders  -     ANORO ELLIPTA 62.5-25 MCG/INH aerosol powder  inhaler; Inhale 1 puff Daily.  -     flunisolide (NASALIDE) 25 MCG/ACT (0.025%) solution nasal spray; Inhale 1 spray Every 12 (Twelve) Hours.  -     Mometasone Furoate (ASMANEX HFA) 200 MCG/ACT aerosol; Inhale 2 puffs 2 (Two) Times a Day. Rinse mouth with water after use.         Return in about 3 months (around 4/22/2018) for Recheck, For Phyllis.    DISCUSSION (if any):  I reviewed her medication list with her in great detail and told her that anoro and Spiriva have similar medications and should not be taken together.    In order to clarify her medications with her, I printed all her pulmonary medications and reviewed them with her in great detail.    I have discontinued Spiriva.    Anoro will be continued.    Since she has symptoms suggestive of underlying asthma, I have added Asmanex.     She was given samples of this along with a prescription with instructions to only fill the prescription if the sample helps her wheezing and coughing.    Exhaled nitric oxide levels were 12.    I've asked her to continue using Nasalide and recommended to be compliant with it as her symptoms clearly worsened when she doesn't use it regularly.    I once again advised her to quit smoking completely.    Dictated utilizing Dragon dictation.    This document was electronically signed by Bianca Huang MD  January 22, 2018  2:20 PM

## 2018-01-30 RX ORDER — FLUNISOLIDE 0.25 MG/ML
SOLUTION NASAL
Qty: 25 ML | Refills: 5 | Status: SHIPPED | OUTPATIENT
Start: 2018-01-30 | End: 2018-04-23 | Stop reason: SDUPTHER

## 2018-04-05 ENCOUNTER — TRANSCRIBE ORDERS (OUTPATIENT)
Dept: ADMINISTRATIVE | Facility: HOSPITAL | Age: 53
End: 2018-04-05

## 2018-04-05 DIAGNOSIS — Z12.39 SCREENING BREAST EXAMINATION: Primary | ICD-10-CM

## 2018-04-17 ENCOUNTER — HOSPITAL ENCOUNTER (EMERGENCY)
Facility: HOSPITAL | Age: 53
Discharge: HOME | End: 2018-04-17
Attending: EMERGENCY MEDICINE | Admitting: EMERGENCY MEDICINE

## 2018-04-17 VITALS
TEMPERATURE: 99 F | HEART RATE: 72 BPM | RESPIRATION RATE: 19 BRPM | OXYGEN SATURATION: 94 % | HEIGHT: 60 IN | BODY MASS INDEX: 30.74 KG/M2 | SYSTOLIC BLOOD PRESSURE: 123 MMHG | DIASTOLIC BLOOD PRESSURE: 82 MMHG | WEIGHT: 156.6 LBS

## 2018-04-17 DIAGNOSIS — M67.431 GANGLION CYST OF WRIST, RIGHT: Primary | ICD-10-CM

## 2018-04-17 PROCEDURE — 99283 EMERGENCY DEPT VISIT LOW MDM: CPT

## 2018-04-18 NOTE — ED PROVIDER NOTES
Subjective   History of Present Illness  This is a 53 year old male who comes in today complaining of right wrist pain radiating in to her hand. She states she has a cyst on her right wrist for 10 years but has gotten bigger. She states the pain is severe and not relieved with hydrocodone she took earlier.   Review of Systems   Constitutional: Negative.    HENT: Negative.    Eyes: Negative.    Respiratory: Negative.    Endocrine: Negative.    Genitourinary: Negative.    Musculoskeletal:        Right wrist pain   Allergic/Immunologic: Negative.    Neurological: Negative.    Hematological: Negative.    Psychiatric/Behavioral: Negative.    All other systems reviewed and are negative.      Past Medical History:   Diagnosis Date   • Asthma    • Chronic bronchitis    • COPD (chronic obstructive pulmonary disease)    • Hyperlipidemia    • Pneumonia    • Sleep apnea, obstructive    • Thyroid disease 2010       Allergies   Allergen Reactions   • Ibuprofen    • Rocephin [Ceftriaxone]        Past Surgical History:   Procedure Laterality Date   • HYSTEROSCOPY ENDOMETRIAL ABLATION     • TUBAL ABDOMINAL LIGATION         Family History   Problem Relation Age of Onset   • Uterine cancer Mother    • Stroke Father    • Diabetes Father    • Hypertension Father    • Heart disease Father      Ischemic heart disease   • COPD Father        Social History     Social History   • Marital status:      Social History Main Topics   • Smoking status: Former Smoker     Packs/day: 1.00     Years: 34.00     Quit date: 12/14/2016   • Smokeless tobacco: Never Used   • Alcohol use No   • Drug use: No     Other Topics Concern   • Not on file           Objective   Physical Exam   Constitutional: She appears well-developed and well-nourished.   Nursing note and vitals reviewed.   GEN: No acute distress  Head: Normocephalic, atraumatic  Eyes: Pupils equal round reactive to light  ENT: Posterior pharynx normal in appearance, oral mucosa is  moist  Chest: Nontender to palpation  Cardiovascular: Regular rate  Lungs: Clear to auscultation bilaterally  Abdomen: Soft, nontender, nondistended, no peritoneal signs  Extremities: No edema, right wrist with ganglion cyst, tender to touch. Limited ROM of wrist due to pain. Full ROM of hands.  Neuro: GCS 15  Psych: Mood and affect are appropriate      Procedures         ED Course  ED Course                  MDM  Number of Diagnoses or Management Options     Amount and/or Complexity of Data Reviewed  Tests in the radiology section of CPT®: ordered and reviewed  Review and summarize past medical records: yes  Discuss the patient with other providers: yes    Risk of Complications, Morbidity, and/or Mortality  Presenting problems: low  Diagnostic procedures: low  Management options: low        Final diagnoses:   Ganglion cyst of wrist, right            Haritha Vazquez, LEX  04/17/18 0493

## 2018-04-23 ENCOUNTER — OFFICE VISIT (OUTPATIENT)
Dept: PULMONOLOGY | Facility: CLINIC | Age: 53
End: 2018-04-23

## 2018-04-23 VITALS
BODY MASS INDEX: 31.22 KG/M2 | HEART RATE: 62 BPM | SYSTOLIC BLOOD PRESSURE: 120 MMHG | DIASTOLIC BLOOD PRESSURE: 70 MMHG | OXYGEN SATURATION: 95 % | WEIGHT: 159 LBS | HEIGHT: 60 IN | RESPIRATION RATE: 17 BRPM

## 2018-04-23 DIAGNOSIS — J30.89 OTHER ALLERGIC RHINITIS: Primary | ICD-10-CM

## 2018-04-23 DIAGNOSIS — R06.02 SHORTNESS OF BREATH: ICD-10-CM

## 2018-04-23 DIAGNOSIS — G47.34 NOCTURNAL HYPOXIA: ICD-10-CM

## 2018-04-23 DIAGNOSIS — Z87.891 PERSONAL HISTORY OF TOBACCO USE, PRESENTING HAZARDS TO HEALTH: ICD-10-CM

## 2018-04-23 DIAGNOSIS — J44.9 COPD WITH ASTHMA (HCC): ICD-10-CM

## 2018-04-23 PROCEDURE — 99214 OFFICE O/P EST MOD 30 MIN: CPT | Performed by: NURSE PRACTITIONER

## 2018-04-23 PROCEDURE — 94618 PULMONARY STRESS TESTING: CPT | Performed by: NURSE PRACTITIONER

## 2018-04-23 RX ORDER — HYDROCODONE BITARTRATE AND ACETAMINOPHEN 7.5; 325 MG/1; MG/1
TABLET ORAL
Refills: 0 | COMMUNITY
Start: 2018-04-18 | End: 2020-11-13

## 2018-04-23 RX ORDER — CITALOPRAM 10 MG/1
10 TABLET ORAL DAILY
Refills: 0 | COMMUNITY
Start: 2018-03-15 | End: 2018-09-04 | Stop reason: ALTCHOICE

## 2018-05-09 ENCOUNTER — TRANSCRIBE ORDERS (OUTPATIENT)
Dept: GENERAL RADIOLOGY | Facility: HOSPITAL | Age: 53
End: 2018-05-09

## 2018-05-09 ENCOUNTER — HOSPITAL ENCOUNTER (OUTPATIENT)
Dept: GENERAL RADIOLOGY | Facility: HOSPITAL | Age: 53
Discharge: HOME | End: 2018-05-09

## 2018-05-09 ENCOUNTER — HOSPITAL ENCOUNTER (OUTPATIENT)
Dept: MAMMOGRAPHY | Facility: HOSPITAL | Age: 53
Discharge: HOME | End: 2018-05-09
Admitting: NURSE PRACTITIONER

## 2018-05-09 DIAGNOSIS — R05.9 COUGH: ICD-10-CM

## 2018-05-09 DIAGNOSIS — R05.9 COUGH: Primary | ICD-10-CM

## 2018-05-09 DIAGNOSIS — Z12.39 SCREENING BREAST EXAMINATION: ICD-10-CM

## 2018-05-09 PROCEDURE — 77067 SCR MAMMO BI INCL CAD: CPT

## 2018-05-09 PROCEDURE — 77063 BREAST TOMOSYNTHESIS BI: CPT

## 2018-05-09 PROCEDURE — 71046 X-RAY EXAM CHEST 2 VIEWS: CPT

## 2018-09-04 ENCOUNTER — OFFICE VISIT (OUTPATIENT)
Dept: NEUROLOGY | Facility: CLINIC | Age: 53
End: 2018-09-04

## 2018-09-04 VITALS
OXYGEN SATURATION: 97 % | WEIGHT: 154 LBS | HEIGHT: 60 IN | SYSTOLIC BLOOD PRESSURE: 108 MMHG | DIASTOLIC BLOOD PRESSURE: 76 MMHG | HEART RATE: 67 BPM | BODY MASS INDEX: 30.23 KG/M2

## 2018-09-04 DIAGNOSIS — G47.19 EXCESSIVE DAYTIME SLEEPINESS: ICD-10-CM

## 2018-09-04 DIAGNOSIS — G47.33 OBSTRUCTIVE SLEEP APNEA: ICD-10-CM

## 2018-09-04 DIAGNOSIS — R55 SYNCOPE AND COLLAPSE: Primary | ICD-10-CM

## 2018-09-04 DIAGNOSIS — G47.34 NOCTURNAL OXYGEN DESATURATION: ICD-10-CM

## 2018-09-04 DIAGNOSIS — G40.109 SEIZURE, TEMPORAL LOBE (HCC): ICD-10-CM

## 2018-09-04 PROCEDURE — 99244 OFF/OP CNSLTJ NEW/EST MOD 40: CPT | Performed by: PSYCHIATRY & NEUROLOGY

## 2018-09-04 RX ORDER — LANCETS 33 GAUGE
EACH MISCELLANEOUS
Refills: 0 | COMMUNITY
Start: 2018-06-15

## 2018-09-04 RX ORDER — TRIAMCINOLONE ACETONIDE 1 MG/G
CREAM TOPICAL
Refills: 0 | COMMUNITY
Start: 2018-08-10 | End: 2020-09-09

## 2018-09-04 RX ORDER — MONTELUKAST SODIUM 10 MG/1
TABLET ORAL
Refills: 0 | COMMUNITY
Start: 2018-08-06 | End: 2021-08-25 | Stop reason: SDUPTHER

## 2018-09-04 RX ORDER — BLOOD-GLUCOSE METER
KIT MISCELLANEOUS
COMMUNITY
Start: 2018-08-11

## 2018-09-04 RX ORDER — SERTRALINE HYDROCHLORIDE 25 MG/1
25 TABLET, FILM COATED ORAL DAILY
Refills: 0 | COMMUNITY
Start: 2018-06-15 | End: 2020-09-09

## 2018-09-04 NOTE — PROGRESS NOTES
[facility] NEUROLOGY [place] CONSULTATION   History of Present Illness     Date: 9/4/2018    Patient Identification  Sarita Horan is a 53 y.o. female.    Patient information was obtained from patient.  History/Exam limitations: none.    CONSULTATION requested by: LEX Martínez    Chief Complaint   Seizures (NP, in office today for consult. Pt c/o seizure like activity while sleeping x7 months )      History of Present Illness   Patient is a pleasant 53-year-old referred to [facility] neurology [place] for evaluation of recurrent seizure.  Patient presents to the emergency room 7 month ago for new onset seizure.  And last month patient woke up at night having blood in her mouth even though there was no witnessed seizure activity.  Patient was discharged from the emergency room.  Patient reported that she did not have any neuro imaging or EEG done at that time.  She was advised to be follow-up at the neurology office.    Patient denies any head trauma, deny history of stroke, deny history of encephalitis or meningitis.  Patient does have history of sleep apnea hypertension diabetes and hyperlipidemia.      Patient does admits to have loud snoring witnessed apneic episode having excessive daytime sleepiness.  Patient will be followed asleep while reading or watching television.  She was followed asleep whenever she get a chance to lay down to rest in the afternoon or when somebody out driving the car.  Patient also fall asleep when she is driving or sitting quietly after lunch.  Patient score 15 point in the [place] sleepiness scale.    PMH:   Past Medical History:   Diagnosis Date   • Anxiety and depression    • Asthma    • Chronic bronchitis (CMS/HCC)    • COPD (chronic obstructive pulmonary disease) (CMS/HCC)    • Diabetes mellitus (CMS/HCC)    • Hyperlipidemia    • Migraine    • Pneumonia    • Sleep apnea, obstructive    • Thyroid disease 2010       Past Surgical History:   Past Surgical History:    Procedure Laterality Date   • HYSTEROSCOPY ENDOMETRIAL ABLATION     • TUBAL ABDOMINAL LIGATION         Family Hisotry:   Family History   Problem Relation Age of Onset   • Uterine cancer Mother    • Stroke Father    • Diabetes Father    • Hypertension Father    • Heart disease Father         Ischemic heart disease   • COPD Father        Social History:   Social History     Social History   • Marital status:      Spouse name: N/A   • Number of children: N/A   • Years of education: N/A     Occupational History   • Not on file.     Social History Main Topics   • Smoking status: Former Smoker     Packs/day: 1.00     Years: 34.00     Quit date: 12/14/2016   • Smokeless tobacco: Never Used   • Alcohol use No   • Drug use: No   • Sexual activity: Not on file     Other Topics Concern   • Not on file     Social History Narrative   • No narrative on file       Medications:   Current Outpatient Prescriptions   Medication Sig Dispense Refill   • albuterol (PROVENTIL) (2.5 MG/3ML) 0.083% nebulizer solution Albuterol Sulfate (2.5 MG/3ML) 0.083% Inhalation Nebulization Solution; Patient Sig: Albuterol Sulfate (2.5 MG/3ML) 0.083% Inhalation Nebulization Solution USE 1 UNIT DOSE EVERY 4-6 HOURS AS NEEDED FOR WHEEZING .; 0; 19-Feb-2014; Active     • ANORO ELLIPTA 62.5-25 MCG/INH aerosol powder  inhaler Inhale 1 puff Daily. 1 each 5   • Blood Glucose Monitoring Suppl (ONE TOUCH ULTRA MINI) w/Device kit      • flunisolide (NASALIDE) 25 MCG/ACT (0.025%) solution nasal spray Inhale 1 spray Every 12 (Twelve) Hours. 1 bottle 5   • gabapentin (NEURONTIN) 800 MG tablet take 1 tablet by mouth three times a day  0   • HYDROcodone-acetaminophen (NORCO) 7.5-325 MG per tablet   0   • levothyroxine (SYNTHROID, LEVOTHROID) 75 MCG tablet   0   • metFORMIN (GLUCOPHAGE) 500 MG tablet   0   • montelukast (SINGULAIR) 10 MG tablet   0   • omeprazole (priLOSEC) 40 MG capsule Take 40 mg by mouth Daily.  0   • ONETOUCH DELICA LANCETS 33G misc USE  AS DIRECTED TO TEST TWICE DAILY  0   • ONETOUCH VERIO test strip   1   • PROAIR  (90 BASE) MCG/ACT inhaler      • RA ACETAMINOPHEN EX  MG tablet take 2 tablet by mouth if needed twice a day  0   • sertraline (ZOLOFT) 25 MG tablet Take 25 mg by mouth Daily.  0   • simvastatin (ZOCOR) 20 MG tablet Take 20 mg by mouth Every Night.  0   • SUMAtriptan (IMITREX) 25 MG tablet take 1 tablet by mouth twice a day if needed  0   • triamcinolone (KENALOG) 0.1 % cream apply to affected area twice a day  0     No current facility-administered medications for this visit.        Allergy:   Allergies   Allergen Reactions   • Ibuprofen Itching, Other (See Comments) and GI Intolerance     Chest pain     • Rocephin [Ceftriaxone] Itching and Rash       Review of Systems:  Review of Systems   Constitutional: Negative for chills and fever.   HENT: Negative for congestion, ear pain, hearing loss, rhinorrhea and sore throat.    Eyes: Negative for pain, discharge and redness.   Respiratory: Negative for cough, shortness of breath, wheezing and stridor.    Cardiovascular: Negative for chest pain, palpitations and leg swelling.   Gastrointestinal: Negative for abdominal pain, constipation, nausea and vomiting.   Endocrine: Negative for cold intolerance, heat intolerance and polyphagia.   Genitourinary: Negative for dysuria, flank pain, frequency and urgency.   Musculoskeletal: Negative for joint swelling, myalgias, neck pain and neck stiffness.   Skin: Negative for pallor, rash and wound.   Allergic/Immunologic: Negative for environmental allergies.   Neurological: Positive for seizures. Negative for dizziness, tremors, syncope, facial asymmetry, speech difficulty, weakness, light-headedness, numbness and headaches.   Hematological: Negative for adenopathy.   Psychiatric/Behavioral: Positive for sleep disturbance. Negative for confusion and hallucinations. The patient is not nervous/anxious.        Physical Exam     Vitals:     "09/04/18 1454   BP: 108/76   Pulse: 67   SpO2: 97%   Weight: 69.9 kg (154 lb)   Height: 152.4 cm (60\")     GENERAL: Patient is pleasant, cooperative, appears to be stated age.  Body habitus is endomorphic.  SKIN AND EXTREMITIES:  No skin rashes or lesions are noted.  No cyanosis, clubbing or edema of the extremities.    HEAD:  Head is normocephalic and atraumatic.    NECK: Neck are non-tender without thyromegaly or adenopathy.  Carotic upstrokes are 1+/4.  No cranial or cervical bruits.  The neck is supple with a full range of motion.   ENT: palate elevate symmetrically, no evidence of high arch palate, tongue midline erythema in posterior pharynx, Mallampati Classification Class III   CARDIOVASCULAR:  Regular rate and rhythm with normal S1 and S2 without rub or gallop.  RESPIRATORY:  Clear to auscultation without wheezes or crackle   ABDOMEN:  Soft and non-tender, positive bowel sound without hepatosplenomegaly  BACK:  Back is straight without midline defect.    PSYCH:  Higher cortical function/mental status:  The patient is alert.  She is oriented x3 to time, place and person.  Recent and the remote memory appear normal.  The patient has a good fund of knowledge.  There is no visual or auditory hallucination or suicidal or homicidal ideation.  SPEECH:There is no gross evidence of aphasia, dysarthria or agnosia.      CRANIAL NERVES:  Pupils are 4mm, equal round reactive to light, reacting briskly to 2mm without afferent pupillary defect.  Visual fields are intact to confrontation testing.  Fundoscopic examination reveals sharp disk margins with normal vasculature.  No papilledema, hemorrhages or exudates.  Extraocular movements are full and smooth with normal pursuits and saccades.  No nystagmus noted.  The face is symmetric. palate elevate symmetrically, Tongue midline, positive gag reflex. The remainder of the cranial nerves are intact and symmetrical.    MOTOR: Strength is 5/5 throughout with normal tone and " bulk with the following exceptions, 4/5 intrinsic muscles of the hands and feet.  No involuntary movements noted.    Deep Tendon Reflexes: are 2/4 and symmetrical in the upper extremities, 2/4 and symmetrical at the knees and 1/4 and symmetrical at the Achilles tendon.  Plantar responses were down-going bilaterally.    SENSATION:  Intact to pinprick, light touch, vibration and proprioception.  Coordination:  The patient normally performs finger-nose-finger, heel-to-knee-to-shin and rapid alternating movements in symmetrical fashion.    COORDINATION AND GAIT:  The patient walks with a narrow-based gait.  Patient is able to heel-toe and tandem walk forward and backwards without difficulty.  Romberg and monopedal  Romberg are negative.    MUSCULOSKELETAL: Range of motion normal, no clubbing, cyanosis, or edema.  No joint swelling.            Records Reviewed: I have personally reviewed her previous medical record.    Sarita was seen today for seizures.    Diagnoses and all orders for this visit:    Syncope and collapse  -     EEG Awake or Asleep Routine; Future  -     MRI Brain With & Without Contrast; Future    Seizure, temporal lobe (CMS/HCC)  -     EEG Awake or Asleep Routine; Future  -     MRI Brain With & Without Contrast; Future    Obstructive sleep apnea    Excessive daytime sleepiness    Nocturnal oxygen desaturation        Treatments:  1.  Counseled patient extensively on good sleep hygiene  2.  Avoid sleep deprivation  3.  Counseled patient the importance of obtaining neuro imaging and EEG  4.  Patient declined antiepileptic medication at this point  Discussion:  Complex partial seizures are often preceded by an aura.  The seizure aura is a simple partial seizure. The aura may manifest itself as a feeling of déjà vu, jamais vu, fear, euphora or depersonalization. The seizure aura might also occur as a visual disturbance, such as tunnel vision or macropsia or micropsia.  Once consciousness is impaired, the  person may display automatisms such as lip smacking, chewing or swallowing. There may also be loss of memory amnesia of the seizure event.  The person may still be able to perform routine tasks such as walking, although such movements are not purposeful or planned. Witnesses may not recognize that anything is wrong.  Complex partial seizures might arise from any lobe of the brain. Complex partial seizures most commonly arise from the mesial temporal lobe, particularly the amygdala, hippocampus and neocortical regions. A common associated brain abnormality is mesial temporal sclerosis which can be evaluated by MRI of brain.  The abnormal electrical activity might spread to the rest of the brain and cause a secondary generalized tonic-clonic seizure.    This Document is signed by Hunter Almanzar MD, FAAN, FAASM September 4, 201810:45 PM

## 2018-09-13 ENCOUNTER — HOSPITAL ENCOUNTER (OUTPATIENT)
Dept: MRI IMAGING | Facility: HOSPITAL | Age: 53
Discharge: HOME | End: 2018-09-13
Attending: PSYCHIATRY & NEUROLOGY

## 2018-09-13 ENCOUNTER — APPOINTMENT (OUTPATIENT)
Dept: SLEEP MEDICINE | Facility: HOSPITAL | Age: 53
End: 2018-09-13
Attending: PSYCHIATRY & NEUROLOGY

## 2018-09-20 ENCOUNTER — HOSPITAL ENCOUNTER (OUTPATIENT)
Dept: SLEEP MEDICINE | Facility: HOSPITAL | Age: 53
End: 2018-09-20
Attending: PSYCHIATRY & NEUROLOGY

## 2018-09-20 ENCOUNTER — HOSPITAL ENCOUNTER (OUTPATIENT)
Dept: MRI IMAGING | Facility: HOSPITAL | Age: 53
Discharge: HOME | End: 2018-09-20
Attending: PSYCHIATRY & NEUROLOGY | Admitting: PSYCHIATRY & NEUROLOGY

## 2018-09-20 DIAGNOSIS — G40.109 SEIZURE, TEMPORAL LOBE (HCC): ICD-10-CM

## 2018-09-20 DIAGNOSIS — R55 SYNCOPE AND COLLAPSE: ICD-10-CM

## 2018-09-20 LAB — CREAT BLDA-MCNC: 0.9 MG/DL (ref 0.6–1.3)

## 2018-09-20 PROCEDURE — 0 GADOBENATE DIMEGLUMINE 529 MG/ML SOLUTION: Performed by: PSYCHIATRY & NEUROLOGY

## 2018-09-20 PROCEDURE — 95819 EEG AWAKE AND ASLEEP: CPT

## 2018-09-20 PROCEDURE — A9577 INJ MULTIHANCE: HCPCS | Performed by: PSYCHIATRY & NEUROLOGY

## 2018-09-20 PROCEDURE — 70553 MRI BRAIN STEM W/O & W/DYE: CPT

## 2018-09-20 PROCEDURE — 82565 ASSAY OF CREATININE: CPT

## 2018-09-20 PROCEDURE — 95819 EEG AWAKE AND ASLEEP: CPT | Performed by: PSYCHIATRY & NEUROLOGY

## 2018-09-20 RX ADMIN — GADOBENATE DIMEGLUMINE 15 ML: 529 INJECTION, SOLUTION INTRAVENOUS at 13:04

## 2018-09-24 ENCOUNTER — TELEPHONE (OUTPATIENT)
Dept: NEUROLOGY | Facility: CLINIC | Age: 53
End: 2018-09-24

## 2018-09-24 NOTE — TELEPHONE ENCOUNTER
Pt does not have a fu until the end of October and says she needs the results of her eeg and Mri before then. Says she needs them for her .      Thanks

## 2018-10-31 ENCOUNTER — OFFICE VISIT (OUTPATIENT)
Dept: NEUROLOGY | Facility: CLINIC | Age: 53
End: 2018-10-31

## 2018-10-31 VITALS
SYSTOLIC BLOOD PRESSURE: 126 MMHG | HEIGHT: 60 IN | HEART RATE: 63 BPM | DIASTOLIC BLOOD PRESSURE: 76 MMHG | OXYGEN SATURATION: 95 %

## 2018-10-31 DIAGNOSIS — G47.33 OBSTRUCTIVE SLEEP APNEA: Primary | ICD-10-CM

## 2018-10-31 DIAGNOSIS — G47.19 EXCESSIVE DAYTIME SLEEPINESS: ICD-10-CM

## 2018-10-31 DIAGNOSIS — G47.34 NOCTURNAL OXYGEN DESATURATION: ICD-10-CM

## 2018-10-31 PROCEDURE — 99214 OFFICE O/P EST MOD 30 MIN: CPT | Performed by: PSYCHIATRY & NEUROLOGY

## 2018-10-31 RX ORDER — LEVETIRACETAM 500 MG/1
500 TABLET ORAL EVERY 12 HOURS SCHEDULED
Qty: 60 TABLET | Refills: 3 | Status: SHIPPED | OUTPATIENT
Start: 2018-10-31 | End: 2019-02-26 | Stop reason: SDUPTHER

## 2019-01-21 RX ORDER — TIOTROPIUM BROMIDE INHALATION SPRAY 3.12 UG/1
SPRAY, METERED RESPIRATORY (INHALATION)
Qty: 4 G | Refills: 2 | OUTPATIENT
Start: 2019-01-21

## 2019-02-26 RX ORDER — LEVETIRACETAM 500 MG/1
TABLET ORAL
Qty: 60 TABLET | Refills: 1 | Status: SHIPPED | OUTPATIENT
Start: 2019-02-26 | End: 2019-04-23 | Stop reason: SDUPTHER

## 2019-04-26 RX ORDER — LEVETIRACETAM 500 MG/1
TABLET ORAL
Qty: 60 TABLET | Refills: 1 | Status: SHIPPED | OUTPATIENT
Start: 2019-04-26 | End: 2020-09-09

## 2019-06-01 NOTE — PROGRESS NOTES
[facility] NEUROLOGY [place] PROGRESS NOTE  History of Present Illness     Date: 10/31/2018    Patient Identification  Sarita Horan is a 53 y.o. female.    Patient information was obtained from patient.  History/Exam limitations: none.    Original consultation requested by: LEX Martínez      Chief Complaint   Results (Pt in office today to review results of MRI and EEG ) and Syncope      History of Present Illness   This is a 54 yo female here for possible seizures and memory concerns.  She had an episode 2 and a half months ago where she woke up with blood coming out of her mouth and her tooth sticking through her tongue.  Her sister and her neighbor saw the event and said she suddenly stared off into space and squeezed her drink, then started shaking and fell to the floor.  She said she felt disoriented when she woke up.  She also had another episode 3 weeks ago where her son told her that he tried to wake her up but she said she had bit her tongue.  She does not remember either event. She denies history of stroke.      She has also been having some memory loss.  It began about a year ago and she feels like it is getting worse. She forgets what she did the day before.  She denies apraxia.  She says she has problems with speech in the form of misspeaking.  She has trouble concentrating.     She was diagnosed with sleep apnea but then had another sleep study and was told she did not have sleep apnea about a year ago.  She was taken off her CPAP machine and was put on nighttime oxygen.  She says she sleeps better but still wakes up throughout.  She says she snores and wakes up gasping for air.    PMH:   Past Medical History:   Diagnosis Date   • Anxiety and depression    • Asthma    • Chronic bronchitis (CMS/Formerly [facility])    • COPD (chronic obstructive pulmonary disease) (CMS/Formerly [facility])    • Diabetes mellitus (CMS/Formerly [facility])    • Hyperlipidemia    • Migraine    • Pneumonia    • Sleep apnea, obstructive    • Thyroid disease  2010       Past Surgical History:   Past Surgical History:   Procedure Laterality Date   • HYSTEROSCOPY ENDOMETRIAL ABLATION     • TUBAL ABDOMINAL LIGATION         Family Hisotry:   Family History   Problem Relation Age of Onset   • Uterine cancer Mother    • Stroke Father    • Diabetes Father    • Hypertension Father    • Heart disease Father         Ischemic heart disease   • COPD Father        Social History:   Social History     Social History   • Marital status:      Spouse name: N/A   • Number of children: N/A   • Years of education: N/A     Occupational History   • Not on file.     Social History Main Topics   • Smoking status: Former Smoker     Packs/day: 1.00     Years: 34.00     Quit date: 12/14/2016   • Smokeless tobacco: Never Used   • Alcohol use No   • Drug use: No   • Sexual activity: Not on file     Other Topics Concern   • Not on file     Social History Narrative   • No narrative on file       Medications:   Current Outpatient Prescriptions   Medication Sig Dispense Refill   • albuterol (PROVENTIL) (2.5 MG/3ML) 0.083% nebulizer solution Albuterol Sulfate (2.5 MG/3ML) 0.083% Inhalation Nebulization Solution; Patient Sig: Albuterol Sulfate (2.5 MG/3ML) 0.083% Inhalation Nebulization Solution USE 1 UNIT DOSE EVERY 4-6 HOURS AS NEEDED FOR WHEEZING .; 0; 19-Feb-2014; Active     • ANORO ELLIPTA 62.5-25 MCG/INH aerosol powder  inhaler Inhale 1 puff Daily. 1 each 5   • Blood Glucose Monitoring Suppl (ONE TOUCH ULTRA MINI) w/Device kit      • flunisolide (NASALIDE) 25 MCG/ACT (0.025%) solution nasal spray Inhale 1 spray Every 12 (Twelve) Hours. 1 bottle 5   • gabapentin (NEURONTIN) 800 MG tablet take 1 tablet by mouth three times a day  0   • HYDROcodone-acetaminophen (NORCO) 7.5-325 MG per tablet   0   • levothyroxine (SYNTHROID, LEVOTHROID) 75 MCG tablet   0   • metFORMIN (GLUCOPHAGE) 500 MG tablet   0   • montelukast (SINGULAIR) 10 MG tablet   0   • omeprazole (priLOSEC) 40 MG capsule Take 40 mg by  mouth Daily.  0   • ONETOUCH DELICA LANCETS 33G misc USE AS DIRECTED TO TEST TWICE DAILY  0   • ONETOUCH VERIO test strip   1   • PROAIR  (90 BASE) MCG/ACT inhaler      • RA ACETAMINOPHEN EX  MG tablet take 2 tablet by mouth if needed twice a day  0   • sertraline (ZOLOFT) 25 MG tablet Take 25 mg by mouth Daily.  0   • simvastatin (ZOCOR) 20 MG tablet Take 20 mg by mouth Every Night.  0   • SUMAtriptan (IMITREX) 25 MG tablet take 1 tablet by mouth twice a day if needed  0   • triamcinolone (KENALOG) 0.1 % cream apply to affected area twice a day  0   • levETIRAcetam (KEPPRA) 500 MG tablet Take 1 tablet by mouth Every 12 (Twelve) Hours. 60 tablet 3     No current facility-administered medications for this visit.        Allergy:   Allergies   Allergen Reactions   • Ibuprofen Itching, Other (See Comments) and GI Intolerance     Chest pain     • Rocephin [Ceftriaxone] Itching and Rash       Review of Systems:  Review of Systems   Constitutional: Negative for chills and fever.   HENT: Negative for congestion, ear pain, hearing loss, rhinorrhea and sore throat.    Eyes: Negative for pain, discharge and redness.   Respiratory: Negative for cough, shortness of breath, wheezing and stridor.    Cardiovascular: Negative for chest pain, palpitations and leg swelling.   Gastrointestinal: Negative for abdominal pain, constipation, nausea and vomiting.   Endocrine: Negative for cold intolerance, heat intolerance and polyphagia.   Genitourinary: Negative for dysuria, flank pain, frequency and urgency.   Musculoskeletal: Negative for joint swelling, myalgias, neck pain and neck stiffness.   Skin: Negative for pallor, rash and wound.   Allergic/Immunologic: Negative for environmental allergies.   Neurological: Positive for syncope. Negative for dizziness, tremors, seizures, facial asymmetry, speech difficulty, weakness, light-headedness, numbness and headaches.   Hematological: Negative for adenopathy.  "  Psychiatric/Behavioral: Negative for confusion and hallucinations. The patient is not nervous/anxious.        Physical Exam     Vitals:    10/31/18 1537   BP: 126/76   Pulse: 63   SpO2: 95%   Height: 152.4 cm (60\")     GENERAL: Patient is pleasant, cooperative, appears to be stated age.  Body habitus is endomorphic.  SKIN AND EXTREMITIES:  No skin rashes or lesions are noted.  No cyanosis, clubbing or edema of the extremities.    HEAD:  Head is normocephalic and atraumatic.    NECK: Neck are non-tender without thyromegaly or adenopathy.  Carotic upstrokes are 1+/4.  No cranial or cervical bruits.  The neck is supple with a full range of motion.   ENT: palate elevate symmetrically, no evidence of high arch palate, tongue midline erythema in posterior pharynx, Mallampati Classification Class III   CARDIOVASCULAR:  Regular rate and rhythm with normal S1 and S2 without rub or gallop.  RESPIRATORY:  Clear to auscultation without wheezes or crackle   ABDOMEN:  Soft and non-tender, positive bowel sound without hepatosplenomegaly  BACK:  Back is straight without midline defect.    PSYCH:  Higher cortical function/mental status:  The patient is alert.  She is oriented x3 to time, place and person.  Recent and the remote memory appear normal.  The patient has a good fund of knowledge.  There is no visual or auditory hallucination or suicidal or homicidal ideation.  SPEECH:There is no gross evidence of aphasia, dysarthria or agnosia.      CRANIAL NERVES:  Pupils are 4mm, equal round reactive to light, reacting briskly to 2mm without afferent pupillary defect.  Visual fields are intact to confrontation testing.  Fundoscopic examination reveals sharp disk margins with normal vasculature.  No papilledema, hemorrhages or exudates.  Extraocular movements are full and smooth with normal pursuits and saccades.  No nystagmus noted.  The face is symmetric. palate elevate symmetrically, Tongue midline, positive gag reflex. The " remainder of the cranial nerves are intact and symmetrical.    MOTOR: Strength is 5/5 throughout with normal tone and bulk with the following exceptions, 4/5 intrinsic muscles of the hands and feet.  No involuntary movements noted.    Deep Tendon Reflexes: are 2/4 and symmetrical in the upper extremities, 2/4 and symmetrical at the knees and 1/4 and symmetrical at the Achilles tendon.  Plantar responses were down-going bilaterally.    SENSATION:  Intact to pinprick, light touch, vibration and proprioception.  Coordination:  The patient normally performs finger-nose-finger, heel-to-knee-to-shin and rapid alternating movements in symmetrical fashion.    COORDINATION AND GAIT:  The patient walks with a narrow-based gait.  Patient is able to heel-toe and tandem walk forward and backwards without difficulty.  Romberg and monopedal  Romberg are negative.    MUSCULOSKELETAL: Range of motion normal, no clubbing, cyanosis, or edema.  No joint swelling.            Records Reviewed: I have personally reviewed her previous medical record.    Sarita was seen today for results and syncope.    Diagnoses and all orders for this visit:    Obstructive sleep apnea  -     Comprehensive Metabolic Panel  -     TSH; Future    Excessive daytime sleepiness  -     Comprehensive Metabolic Panel  -     TSH; Future    Nocturnal oxygen desaturation  -     Comprehensive Metabolic Panel  -     TSH; Future    Other orders  -     levETIRAcetam (KEPPRA) 500 MG tablet; Take 1 tablet by mouth Every 12 (Twelve) Hours.        Treatments:  1.  Will start patient on Lizzy 500 mg twice a day  2.  Will check TSH level and complete metabolic panel  Discussion:  Complex partial seizures are often preceded by an aura.  The seizure aura is a simple partial seizure. The aura may manifest itself as a feeling of déjà vu, jamais vu, fear, euphora or depersonalization. The seizure aura might also occur as a visual disturbance, such as tunnel vision or macropsia or  micropsia.  Once consciousness is impaired, the person may display automatisms such as lip smacking, chewing or swallowing. There may also be loss of memory amnesia of the seizure event.  The person may still be able to perform routine tasks such as walking, although such movements are not purposeful or planned. Witnesses may not recognize that anything is wrong.  Complex partial seizures might arise from any lobe of the brain. Complex partial seizures most commonly arise from the mesial temporal lobe, particularly the amygdala, hippocampus and neocortical regions. A common associated brain abnormality is mesial temporal sclerosis which can be evaluated by MRI of brain.  The abnormal electrical activity might spread to the rest of the brain and cause a secondary generalized tonic-clonic seizure.      This Document is signed by Hunter Almanzar MD, FAAN, FAASM   October 31, 20186:11 PM     normal...

## 2019-07-12 ENCOUNTER — TRANSCRIBE ORDERS (OUTPATIENT)
Dept: GENERAL RADIOLOGY | Facility: HOSPITAL | Age: 54
End: 2019-07-12

## 2019-07-12 ENCOUNTER — TRANSCRIBE ORDERS (OUTPATIENT)
Dept: LAB | Facility: HOSPITAL | Age: 54
End: 2019-07-12

## 2019-07-12 ENCOUNTER — HOSPITAL ENCOUNTER (OUTPATIENT)
Dept: GENERAL RADIOLOGY | Facility: HOSPITAL | Age: 54
Discharge: HOME | End: 2019-07-12
Admitting: NURSE PRACTITIONER

## 2019-07-12 DIAGNOSIS — M25.562 LEFT KNEE PAIN, UNSPECIFIED CHRONICITY: ICD-10-CM

## 2019-07-12 DIAGNOSIS — M25.562 LEFT KNEE PAIN, UNSPECIFIED CHRONICITY: Primary | ICD-10-CM

## 2019-07-12 PROCEDURE — 73562 X-RAY EXAM OF KNEE 3: CPT

## 2019-09-03 ENCOUNTER — OFFICE VISIT (OUTPATIENT)
Dept: PULMONOLOGY | Facility: CLINIC | Age: 54
End: 2019-09-03

## 2019-09-03 VITALS
HEIGHT: 60 IN | DIASTOLIC BLOOD PRESSURE: 78 MMHG | BODY MASS INDEX: 31.8 KG/M2 | RESPIRATION RATE: 16 BRPM | WEIGHT: 162 LBS | SYSTOLIC BLOOD PRESSURE: 118 MMHG

## 2019-09-03 DIAGNOSIS — J44.9 CHRONIC OBSTRUCTIVE PULMONARY DISEASE, UNSPECIFIED COPD TYPE (HCC): Primary | ICD-10-CM

## 2019-09-03 DIAGNOSIS — J30.89 OTHER ALLERGIC RHINITIS: ICD-10-CM

## 2019-09-03 DIAGNOSIS — G47.34 NOCTURNAL HYPOXIA: ICD-10-CM

## 2019-09-03 PROCEDURE — 99214 OFFICE O/P EST MOD 30 MIN: CPT | Performed by: NURSE PRACTITIONER

## 2019-09-03 RX ORDER — BUSPIRONE HYDROCHLORIDE 7.5 MG/1
7.5 TABLET ORAL 2 TIMES DAILY
Refills: 0 | COMMUNITY
Start: 2019-08-21 | End: 2020-09-09

## 2019-09-03 RX ORDER — ERGOCALCIFEROL 1.25 MG/1
CAPSULE ORAL
Refills: 2 | COMMUNITY
Start: 2019-08-26 | End: 2020-09-09

## 2019-09-03 RX ORDER — LEVOTHYROXINE SODIUM 0.12 MG/1
TABLET ORAL
Refills: 1 | COMMUNITY
Start: 2019-08-26

## 2019-09-03 RX ORDER — LEVOTHYROXINE SODIUM 112 UG/1
TABLET ORAL
Refills: 2 | COMMUNITY
Start: 2019-08-09 | End: 2020-09-09 | Stop reason: DRUGHIGH

## 2019-09-03 RX ORDER — DILTIAZEM HYDROCHLORIDE 60 MG/1
TABLET, FILM COATED ORAL
Refills: 2 | COMMUNITY
Start: 2019-06-27 | End: 2019-09-03

## 2019-09-03 NOTE — PROGRESS NOTES
"Chief Complaint   Patient presents with   • Follow-up   • Breathing Problem         Subjective   Sarita Horan is a 54 y.o. female.     History of Present Illness   The patient comes in today for follow-up of COPD and nocturnal hypoxia.    She has not been seen by our clinic since April 2018.      She is using oxygen at night and feels like it helps.    She was previously on Anoro but she was changed to Trelegy 5 to 6 months ago by her PCP.    She takes Singulair nightly.    She denies any change in her exercise capacity.  She does report increased shortness of breath when she is exposed to heat and high humidity levels.    She denies any respiratory illness since being changed to Trelegy.    She uses Nasalide twice a day and feels that it controls the symptoms.    She quit smoking 2 years ago.    The following portions of the patient's history were reviewed and updated as appropriate: allergies, current medications, past family history, past medical history, past social history and past surgical history.    Review of Systems   HENT: Negative for sinus pressure, sneezing and sore throat.    Respiratory: Positive for cough, shortness of breath and wheezing.        Objective   Visit Vitals  /78   Resp 16   Ht 152.4 cm (60\")   Wt 73.5 kg (162 lb)   BMI 31.64 kg/m²     Physical Exam   Constitutional: She is oriented to person, place, and time. She appears well-developed and well-nourished.   HENT:   Head: Normocephalic and atraumatic.   Eyes: EOM are normal.   Neck: Neck supple.   Cardiovascular: Normal rate and regular rhythm.   Pulmonary/Chest: Effort normal. No respiratory distress.   Somewhat decreased A/E without wheezing noted.   Musculoskeletal: She exhibits no edema.   Neurological: She is alert and oriented to person, place, and time.   Skin: Skin is warm and dry.   Psychiatric: She has a normal mood and affect.   Vitals reviewed.          Assessment/Plan   Sarita was seen today for follow-up and " breathing problem.    Diagnoses and all orders for this visit:    Chronic obstructive pulmonary disease, unspecified COPD type (CMS/[facility])    Other allergic rhinitis    Nocturnal hypoxia           Return in about 6 months (around 3/3/2020) for Recheck, For Dr. Huang.    DISCUSSION (if any):  Overall her symptoms of COPD seem to be stable with her current medications.  She is not needing to use her rescue medication on a regular basis.  She should continue using Trelegy daily.    The allergic rhinitis symptoms are stable with the use of Nasalide regularly so I have asked her to continue using it.    She will need to continue using oxygen at night especially since she feels she has benefited from the use of it.      Dictated utilizing Dragon dictation.    This document was electronically signed by LEX Hawk September 12, 2019  2:51 PM

## 2020-08-17 ENCOUNTER — TRANSCRIBE ORDERS (OUTPATIENT)
Dept: ADMINISTRATIVE | Facility: HOSPITAL | Age: 55
End: 2020-08-17

## 2020-08-17 DIAGNOSIS — Z12.31 VISIT FOR SCREENING MAMMOGRAM: Primary | ICD-10-CM

## 2020-09-09 ENCOUNTER — PROCEDURE VISIT (OUTPATIENT)
Dept: OBSTETRICS AND GYNECOLOGY | Facility: CLINIC | Age: 55
End: 2020-09-09

## 2020-09-09 VITALS
SYSTOLIC BLOOD PRESSURE: 152 MMHG | BODY MASS INDEX: 31.14 KG/M2 | HEIGHT: 60 IN | WEIGHT: 158.6 LBS | DIASTOLIC BLOOD PRESSURE: 82 MMHG

## 2020-09-09 DIAGNOSIS — N81.10 CYSTOCELE WITH RECTOCELE: ICD-10-CM

## 2020-09-09 DIAGNOSIS — Z01.419 ENCOUNTER FOR GYNECOLOGICAL EXAMINATION WITHOUT ABNORMAL FINDING: Primary | ICD-10-CM

## 2020-09-09 DIAGNOSIS — Z12.4 SCREENING FOR CERVICAL CANCER: ICD-10-CM

## 2020-09-09 DIAGNOSIS — N81.6 CYSTOCELE WITH RECTOCELE: ICD-10-CM

## 2020-09-09 PROCEDURE — 99396 PREV VISIT EST AGE 40-64: CPT | Performed by: PHYSICIAN ASSISTANT

## 2020-09-09 NOTE — PROGRESS NOTES
Subjective   Chief Complaint   Patient presents with   • Gynecologic Exam     Last pap done in 2018, MMG is scheduled,  Patient thinks she feels like something is hanging out of vagina       Sarita Horan is a 55 y.o. year old new patient  presenting to be seen for her annual gynecological exam.   She reports her last Pap smear was 2018.  Her last mammogram was also 2018 but she has mammogram scheduled for later this month at [facility].  Her last menstrual period has been 7 to 8 years ago after having an endometrial ablation.  She reports feeling a bulge in the vagina for about 1 year now.  She is not having any pain in the vaginal area or pelvic pain.  She is not having any issues with urination or bowel movements.      Past Medical History:   Diagnosis Date   • Anxiety and depression    • Asthma    • Chronic bronchitis (CMS/HCC)    • COPD (chronic obstructive pulmonary disease) (CMS/HCC)    • Diabetes mellitus (CMS/HCC)    • Hyperlipidemia    • Migraine    • Pneumonia    • Sleep apnea, obstructive    • Thyroid disease         Current Outpatient Medications:   •  albuterol (PROVENTIL) (2.5 MG/3ML) 0.083% nebulizer solution, Albuterol Sulfate (2.5 MG/3ML) 0.083% Inhalation Nebulization Solution; Patient Sig: Albuterol Sulfate (2.5 MG/3ML) 0.083% Inhalation Nebulization Solution USE 1 UNIT DOSE EVERY 4-6 HOURS AS NEEDED FOR WHEEZING .; 0; -2014; Active, Disp: , Rfl:   •  Blood Glucose Monitoring Suppl (ONE TOUCH ULTRA MINI) w/Device kit, , Disp: , Rfl:   •  gabapentin (NEURONTIN) 800 MG tablet, take 1 tablet by mouth three times a day, Disp: , Rfl: 0  •  HYDROcodone-acetaminophen (NORCO) 7.5-325 MG per tablet, , Disp: , Rfl: 0  •  levothyroxine (SYNTHROID, LEVOTHROID) 125 MCG tablet, TK 1 T PO QD IN THE MORNING OES, Disp: , Rfl: 1  •  metFORMIN (GLUCOPHAGE) 500 MG tablet, , Disp: , Rfl: 0  •  montelukast (SINGULAIR) 10 MG tablet, , Disp: , Rfl: 0  •  omeprazole (priLOSEC) 40 MG  capsule, Take 40 mg by mouth Daily., Disp: , Rfl: 0  •  ONETOUCH DELICA LANCETS 33G misc, USE AS DIRECTED TO TEST TWICE DAILY, Disp: , Rfl: 0  •  ONETOUCH VERIO test strip, , Disp: , Rfl: 1  •  PROAIR  (90 BASE) MCG/ACT inhaler, , Disp: , Rfl:   •  RA ACETAMINOPHEN EX  MG tablet, take 2 tablet by mouth if needed twice a day, Disp: , Rfl: 0  •  simvastatin (ZOCOR) 20 MG tablet, Take 20 mg by mouth Every Night., Disp: , Rfl: 0  •  SUMAtriptan (IMITREX) 25 MG tablet, take 1 tablet by mouth twice a day if needed, Disp: , Rfl: 0  •  SYMBICORT 80-4.5 MCG/ACT inhaler, INL 2 PFS PO BID, Disp: , Rfl:   •  TRELEGY ELLIPTA 100-62.5-25 MCG/INH aerosol powder , INL 1 PUFF PO QD, Disp: , Rfl: 2  •  flunisolide (NASALIDE) 25 MCG/ACT (0.025%) solution nasal spray, Inhale 1 spray Every 12 (Twelve) Hours., Disp: 1 bottle, Rfl: 5   Allergies   Allergen Reactions   • Ibuprofen Itching, Other (See Comments) and GI Intolerance     Chest pain     • Rocephin [Ceftriaxone] Itching and Rash      Past Surgical History:   Procedure Laterality Date   • HYSTEROSCOPY ENDOMETRIAL ABLATION     • TUBAL ABDOMINAL LIGATION        Social History     Socioeconomic History   • Marital status:      Spouse name: Not on file   • Number of children: Not on file   • Years of education: Not on file   • Highest education level: Not on file   Tobacco Use   • Smoking status: Former Smoker     Packs/day: 1.00     Years: 34.00     Pack years: 34.00     Last attempt to quit: 12/14/2016     Years since quitting: 3.7   • Smokeless tobacco: Never Used   Substance and Sexual Activity   • Alcohol use: No   • Drug use: No   • Sexual activity: Not Currently     Partners: Male      Family History   Problem Relation Age of Onset   • Uterine cancer Mother    • Stroke Father    • Diabetes Father    • Hypertension Father    • Heart disease Father         Ischemic heart disease   • COPD Father        Review of Systems   Constitutional:        Weight gain  "  Gastrointestinal: Negative for abdominal pain, nausea and vomiting.   Endocrine:        Hot flashes   Genitourinary: Negative for difficulty urinating, dysuria, pelvic pain, vaginal bleeding and vaginal discharge.   Musculoskeletal: Negative.    Hematological: Bruises/bleeds easily.   All other systems reviewed and are negative.          Objective   /82   Ht 152.4 cm (60\")   Wt 71.9 kg (158 lb 9.6 oz)   Breastfeeding No   BMI 30.97 kg/m²     Physical Exam   Constitutional: She appears well-developed and well-nourished. She is cooperative. No distress.   Eyes: Conjunctivae, EOM and lids are normal.   Pulmonary/Chest: Right breast exhibits no inverted nipple, no mass, no nipple discharge, no skin change and no tenderness. Left breast exhibits no inverted nipple, no mass, no nipple discharge, no skin change and no tenderness.   Abdominal: Soft. Normal appearance. There is no tenderness. There is no rigidity and no guarding.   Genitourinary: Uterus normal. There is no tenderness or lesion on the right labia. There is no tenderness or lesion on the left labia. Cervix exhibits no motion tenderness, no discharge and no friability. Right adnexum displays no mass and no tenderness. Left adnexum displays no mass and no tenderness. No erythema or tenderness in the vagina. No vaginal discharge found.   Genitourinary Comments: Grade 2 cystocele  Grade 1 rectocele  Pap done   Musculoskeletal: Normal range of motion.   Neurological: She is alert.   Skin: Skin is warm and dry. No lesion and no rash noted.   Psychiatric: She has a normal mood and affect. Her behavior is normal. Thought content normal.            Assessment and Plan  Sarita was seen today for gynecologic exam.    Diagnoses and all orders for this visit:    Encounter for gynecological examination without abnormal finding    Screening for cervical cancer  -     Liquid-based Pap Smear, Screening; Future    Cystocele with rectocele      Patient Instructions "   Self breast exam monthly  Annual mammogram  Calcium 1200 mg daily and vit D 2000 mg daily  Regular exercise  Patient is advised to begin [place] exercises daily x100.  She is advised to try and avoid any heavy or strenuous lifting.  Follow-up in 3 months or as needed.             This note was electronically signed.    Radha Pearce PA-C   September 9, 2020

## 2020-09-09 NOTE — PATIENT INSTRUCTIONS
Self breast exam monthly  Annual mammogram  Calcium 1200 mg daily and vit D 2000 mg daily  Regular exercise  Patient is advised to begin [place] exercises daily x100.  She is advised to try and avoid any heavy or strenuous lifting.  Follow-up in 3 months or as needed.

## 2020-09-18 DIAGNOSIS — Z12.4 SCREENING FOR CERVICAL CANCER: ICD-10-CM

## 2020-11-11 ENCOUNTER — HOSPITAL ENCOUNTER (OUTPATIENT)
Dept: GENERAL RADIOLOGY | Facility: HOSPITAL | Age: 55
Discharge: HOME | End: 2020-11-11

## 2020-11-11 ENCOUNTER — TRANSCRIBE ORDERS (OUTPATIENT)
Dept: GENERAL RADIOLOGY | Facility: HOSPITAL | Age: 55
End: 2020-11-11

## 2020-11-11 DIAGNOSIS — M25.511 RIGHT SHOULDER PAIN, UNSPECIFIED CHRONICITY: Primary | ICD-10-CM

## 2020-11-11 DIAGNOSIS — M25.511 RIGHT SHOULDER PAIN, UNSPECIFIED CHRONICITY: ICD-10-CM

## 2020-11-11 PROCEDURE — 73030 X-RAY EXAM OF SHOULDER: CPT

## 2020-11-30 ENCOUNTER — HOSPITAL ENCOUNTER (EMERGENCY)
Facility: HOSPITAL | Age: 55
Discharge: LEFT BEFORE DISCHARGE | End: 2020-11-30

## 2020-11-30 VITALS
TEMPERATURE: 99.1 F | HEIGHT: 60 IN | DIASTOLIC BLOOD PRESSURE: 84 MMHG | SYSTOLIC BLOOD PRESSURE: 142 MMHG | HEART RATE: 54 BPM | BODY MASS INDEX: 31.65 KG/M2 | RESPIRATION RATE: 18 BRPM | WEIGHT: 161.2 LBS | OXYGEN SATURATION: 98 %

## 2020-12-02 ENCOUNTER — HOSPITAL ENCOUNTER (EMERGENCY)
Facility: HOSPITAL | Age: 55
Discharge: HOME | End: 2020-12-02
Attending: STUDENT IN AN ORGANIZED HEALTH CARE EDUCATION/TRAINING PROGRAM | Admitting: STUDENT IN AN ORGANIZED HEALTH CARE EDUCATION/TRAINING PROGRAM

## 2020-12-02 VITALS
BODY MASS INDEX: 32.12 KG/M2 | RESPIRATION RATE: 16 BRPM | TEMPERATURE: 98.3 F | SYSTOLIC BLOOD PRESSURE: 144 MMHG | OXYGEN SATURATION: 100 % | DIASTOLIC BLOOD PRESSURE: 90 MMHG | HEART RATE: 68 BPM | WEIGHT: 163.6 LBS | HEIGHT: 60 IN

## 2020-12-02 DIAGNOSIS — M75.51 ACUTE BURSITIS OF RIGHT SHOULDER: Primary | ICD-10-CM

## 2020-12-02 DIAGNOSIS — M75.01 ADHESIVE CAPSULITIS OF RIGHT SHOULDER: ICD-10-CM

## 2020-12-02 PROCEDURE — 25010000002 TRIAMCINOLONE PER 10 MG: Performed by: NURSE PRACTITIONER

## 2020-12-02 PROCEDURE — 96372 THER/PROPH/DIAG INJ SC/IM: CPT

## 2020-12-02 PROCEDURE — 99282 EMERGENCY DEPT VISIT SF MDM: CPT

## 2020-12-02 RX ORDER — TRIAMCINOLONE ACETONIDE 40 MG/ML
40 INJECTION, SUSPENSION INTRA-ARTICULAR; INTRAMUSCULAR ONCE
Status: COMPLETED | OUTPATIENT
Start: 2020-12-02 | End: 2020-12-02

## 2020-12-02 RX ORDER — METHYLPREDNISOLONE 4 MG/1
TABLET ORAL
Qty: 21 TABLET | Refills: 0 | Status: SHIPPED | OUTPATIENT
Start: 2020-12-02 | End: 2021-08-25

## 2020-12-02 RX ADMIN — TRIAMCINOLONE ACETONIDE 40 MG: 40 INJECTION, SUSPENSION INTRA-ARTICULAR; INTRAMUSCULAR at 15:57

## 2020-12-02 NOTE — ED PROVIDER NOTES
Subjective   Patient presents to the emergency department with complaint of right shoulder pain and limited range of motion without history of injury to her recollection.  Approximately 4 weeks ago, she discussed this with her primary care provider who ordered outpatient shoulder films.  X-ray showed degenerative changes only.  Patient has no neurosensory complaint or focal weakness.  Her pain is ongoing and limiting her range of motion and ability to perform her work effectively.      History provided by:  Patient   used: No    Shoulder Injury  Location:  Shoulder pain  Quality:  Aching to the right shoulder with limited range of motion  Severity:  Moderate  Onset quality:  Gradual  Duration:  5 weeks  Timing:  Constant  Progression:  Worsening  Chronicity:  New  Associated symptoms: no headaches and no shortness of breath        Review of Systems   Respiratory: Negative for shortness of breath.    Musculoskeletal: Positive for arthralgias. Negative for joint swelling.   Neurological: Negative for headaches.   All other systems reviewed and are negative.      Past Medical History:   Diagnosis Date   • Anxiety and depression    • Asthma    • Chronic bronchitis (CMS/HCC)    • COPD (chronic obstructive pulmonary disease) (CMS/HCC)    • Diabetes mellitus (CMS/HCC)    • Hyperlipidemia    • Migraine    • Pneumonia    • Sleep apnea, obstructive    • Thyroid disease 2010       Allergies   Allergen Reactions   • Ibuprofen Itching, Other (See Comments) and GI Intolerance     Chest pain     • Rocephin [Ceftriaxone] Itching and Rash       Past Surgical History:   Procedure Laterality Date   • HYSTEROSCOPY ENDOMETRIAL ABLATION     • TUBAL ABDOMINAL LIGATION         Family History   Problem Relation Age of Onset   • Uterine cancer Mother    • Stroke Father    • Diabetes Father    • Hypertension Father    • Heart disease Father         Ischemic heart disease   • COPD Father        Social History      Socioeconomic History   • Marital status:      Spouse name: Not on file   • Number of children: Not on file   • Years of education: Not on file   • Highest education level: Not on file   Tobacco Use   • Smoking status: Former Smoker     Packs/day: 1.00     Years: 34.00     Pack years: 34.00     Quit date: 12/14/2016     Years since quitting: 3.9   • Smokeless tobacco: Never Used   Substance and Sexual Activity   • Alcohol use: No   • Drug use: No   • Sexual activity: Not Currently     Partners: Male           Objective   Physical Exam  Vitals signs and nursing note reviewed.   Constitutional:       General: She is not in acute distress.     Appearance: Normal appearance. She is not ill-appearing.   HENT:      Head: Normocephalic.      Right Ear: External ear normal.      Left Ear: External ear normal.      Nose: Nose normal.      Mouth/Throat:      Mouth: Mucous membranes are moist.      Pharynx: No oropharyngeal exudate.   Eyes:      Conjunctiva/sclera: Conjunctivae normal.   Neck:      Musculoskeletal: Normal range of motion and neck supple. No muscular tenderness.   Cardiovascular:      Rate and Rhythm: Normal rate and regular rhythm.      Heart sounds: No murmur.   Pulmonary:      Effort: Pulmonary effort is normal. No respiratory distress.      Breath sounds: Normal breath sounds.   Abdominal:      General: Bowel sounds are normal. There is no distension.      Palpations: Abdomen is soft.      Tenderness: There is no abdominal tenderness.   Musculoskeletal: Normal range of motion.         General: No swelling, tenderness or deformity.      Comments: Patient has no gross deformity.  No anterior fullness.  Patient localizes her greatest pain at the AC joint and the upper deltoid.  Her range of motion is limited in both flexion and extension.  Distal joints are negative.  Neurovascular exam is negative.  There is no warmth or crepitus   Skin:     General: Skin is warm and dry.      Capillary Refill:  "Capillary refill takes less than 2 seconds.      Coloration: Skin is not pale.      Findings: No lesion.   Neurological:      General: No focal deficit present.      Mental Status: She is alert and oriented to person, place, and time.      Comments: No Neurosensory deficit or focal weakness     Psychiatric:         Behavior: Behavior normal.         Thought Content: Thought content normal.         Procedures           ED Course      No results found for this or any previous visit (from the past 24 hour(s)).  Note: In addition to lab results from this visit, the labs listed above may include labs taken at another facility or during a different encounter within the last 24 hours. Please correlate lab times with ED admission and discharge times for further clarification of the services performed during this visit.    No orders to display     Vitals:    12/02/20 1509   BP: 156/95   BP Location: Right arm   Patient Position: Sitting   Pulse: 72   Resp: 16   Temp: 98.5 °F (36.9 °C)   TempSrc: Oral   SpO2: 99%   Weight: 74.2 kg (163 lb 9.6 oz)   Height: 152.4 cm (60\")     Medications   triamcinolone acetonide (KENALOG-40) injection 40 mg (40 mg Intramuscular Given 12/2/20 0957)     ECG/EMG Results (last 24 hours)     ** No results found for the last 24 hours. **        No orders to display                                            MDM    Final diagnoses:   Acute bursitis of right shoulder   Adhesive capsulitis of right shoulder            Suzie Dennison, LEX  12/02/20 1634    "

## 2020-12-02 NOTE — DISCHARGE INSTRUCTIONS
Use the sling to help facilitate immobilization and rest.  Call the office of orthopedic surgeon, Dr. Martin for follow-up appointment.  Start your steroid Dosepak tomorrow with breakfast.

## 2021-05-31 ENCOUNTER — APPOINTMENT (OUTPATIENT)
Dept: CT IMAGING | Facility: HOSPITAL | Age: 56
End: 2021-05-31

## 2021-05-31 ENCOUNTER — HOSPITAL ENCOUNTER (EMERGENCY)
Facility: HOSPITAL | Age: 56
Discharge: HOME | End: 2021-05-31
Attending: EMERGENCY MEDICINE | Admitting: EMERGENCY MEDICINE

## 2021-05-31 VITALS
WEIGHT: 157 LBS | OXYGEN SATURATION: 97 % | TEMPERATURE: 98.8 F | HEIGHT: 60 IN | DIASTOLIC BLOOD PRESSURE: 81 MMHG | HEART RATE: 74 BPM | SYSTOLIC BLOOD PRESSURE: 125 MMHG | BODY MASS INDEX: 30.82 KG/M2 | RESPIRATION RATE: 18 BRPM

## 2021-05-31 DIAGNOSIS — K81.9 CHOLECYSTITIS: Primary | ICD-10-CM

## 2021-05-31 LAB
ALBUMIN SERPL-MCNC: 4 G/DL (ref 3.5–5.2)
ALBUMIN/GLOB SERPL: 1.4 G/DL
ALP SERPL-CCNC: 88 U/L (ref 39–117)
ALT SERPL W P-5'-P-CCNC: 23 U/L (ref 1–33)
ANION GAP SERPL CALCULATED.3IONS-SCNC: 11.5 MMOL/L (ref 5–15)
AST SERPL-CCNC: 20 U/L (ref 1–32)
BASOPHILS # BLD AUTO: 0.06 10*3/MM3 (ref 0–0.2)
BASOPHILS NFR BLD AUTO: 0.5 % (ref 0–1.5)
BILIRUB SERPL-MCNC: 0.3 MG/DL (ref 0–1.2)
BUN SERPL-MCNC: 15 MG/DL (ref 6–20)
BUN/CREAT SERPL: 18.5 (ref 7–25)
CALCIUM SPEC-SCNC: 9.3 MG/DL (ref 8.6–10.5)
CHLORIDE SERPL-SCNC: 104 MMOL/L (ref 98–107)
CO2 SERPL-SCNC: 25.5 MMOL/L (ref 22–29)
CREAT SERPL-MCNC: 0.81 MG/DL (ref 0.57–1)
DEPRECATED RDW RBC AUTO: 47.2 FL (ref 37–54)
EOSINOPHIL # BLD AUTO: 0.14 10*3/MM3 (ref 0–0.4)
EOSINOPHIL NFR BLD AUTO: 1.1 % (ref 0.3–6.2)
ERYTHROCYTE [DISTWIDTH] IN BLOOD BY AUTOMATED COUNT: 13.1 % (ref 12.3–15.4)
GFR SERPL CREATININE-BSD FRML MDRD: 73 ML/MIN/1.73
GLOBULIN UR ELPH-MCNC: 2.8 GM/DL
GLUCOSE SERPL-MCNC: 94 MG/DL (ref 65–99)
HCT VFR BLD AUTO: 39.9 % (ref 34–46.6)
HGB BLD-MCNC: 13.2 G/DL (ref 12–15.9)
HOLD SPECIMEN: NORMAL
IMM GRANULOCYTES # BLD AUTO: 0.03 10*3/MM3 (ref 0–0.05)
IMM GRANULOCYTES NFR BLD AUTO: 0.2 % (ref 0–0.5)
LIPASE SERPL-CCNC: 32 U/L (ref 13–60)
LYMPHOCYTES # BLD AUTO: 5.88 10*3/MM3 (ref 0.7–3.1)
LYMPHOCYTES NFR BLD AUTO: 46.3 % (ref 19.6–45.3)
MCH RBC QN AUTO: 32.4 PG (ref 26.6–33)
MCHC RBC AUTO-ENTMCNC: 33.1 G/DL (ref 31.5–35.7)
MCV RBC AUTO: 97.8 FL (ref 79–97)
MONOCYTES # BLD AUTO: 0.51 10*3/MM3 (ref 0.1–0.9)
MONOCYTES NFR BLD AUTO: 4 % (ref 5–12)
NEUTROPHILS NFR BLD AUTO: 47.9 % (ref 42.7–76)
NEUTROPHILS NFR BLD AUTO: 6.07 10*3/MM3 (ref 1.7–7)
NRBC BLD AUTO-RTO: 0 /100 WBC (ref 0–0.2)
PLATELET # BLD AUTO: 298 10*3/MM3 (ref 140–450)
PMV BLD AUTO: 9.2 FL (ref 6–12)
POTASSIUM SERPL-SCNC: 3.5 MMOL/L (ref 3.5–5.2)
PROT SERPL-MCNC: 6.8 G/DL (ref 6–8.5)
RBC # BLD AUTO: 4.08 10*6/MM3 (ref 3.77–5.28)
SODIUM SERPL-SCNC: 141 MMOL/L (ref 136–145)
TROPONIN T SERPL-MCNC: <0.01 NG/ML (ref 0–0.03)
WBC # BLD AUTO: 12.69 10*3/MM3 (ref 3.4–10.8)
WHOLE BLOOD HOLD SPECIMEN: NORMAL
WHOLE BLOOD HOLD SPECIMEN: NORMAL

## 2021-05-31 PROCEDURE — 74176 CT ABD & PELVIS W/O CONTRAST: CPT

## 2021-05-31 PROCEDURE — 25010000002 ERTAPENEM PER 500 MG: Performed by: EMERGENCY MEDICINE

## 2021-05-31 PROCEDURE — 83690 ASSAY OF LIPASE: CPT | Performed by: EMERGENCY MEDICINE

## 2021-05-31 PROCEDURE — 99283 EMERGENCY DEPT VISIT LOW MDM: CPT

## 2021-05-31 PROCEDURE — 84484 ASSAY OF TROPONIN QUANT: CPT | Performed by: EMERGENCY MEDICINE

## 2021-05-31 PROCEDURE — 85025 COMPLETE CBC W/AUTO DIFF WBC: CPT | Performed by: EMERGENCY MEDICINE

## 2021-05-31 PROCEDURE — 25010000002 ONDANSETRON PER 1 MG: Performed by: EMERGENCY MEDICINE

## 2021-05-31 PROCEDURE — 96375 TX/PRO/DX INJ NEW DRUG ADDON: CPT

## 2021-05-31 PROCEDURE — 96365 THER/PROPH/DIAG IV INF INIT: CPT

## 2021-05-31 PROCEDURE — 93005 ELECTROCARDIOGRAM TRACING: CPT | Performed by: EMERGENCY MEDICINE

## 2021-05-31 PROCEDURE — 80053 COMPREHEN METABOLIC PANEL: CPT | Performed by: EMERGENCY MEDICINE

## 2021-05-31 RX ORDER — ONDANSETRON 4 MG/1
4 TABLET, ORALLY DISINTEGRATING ORAL 4 TIMES DAILY PRN
Qty: 20 TABLET | Refills: 0 | Status: SHIPPED | OUTPATIENT
Start: 2021-05-31 | End: 2022-12-17

## 2021-05-31 RX ORDER — SODIUM CHLORIDE 0.9 % (FLUSH) 0.9 %
10 SYRINGE (ML) INJECTION AS NEEDED
Status: DISCONTINUED | OUTPATIENT
Start: 2021-05-31 | End: 2021-05-31 | Stop reason: HOSPADM

## 2021-05-31 RX ORDER — ONDANSETRON 2 MG/ML
4 INJECTION INTRAMUSCULAR; INTRAVENOUS ONCE
Status: COMPLETED | OUTPATIENT
Start: 2021-05-31 | End: 2021-05-31

## 2021-05-31 RX ORDER — AMOXICILLIN AND CLAVULANATE POTASSIUM 875; 125 MG/1; MG/1
1 TABLET, FILM COATED ORAL EVERY 12 HOURS
Qty: 20 TABLET | Refills: 0 | Status: SHIPPED | OUTPATIENT
Start: 2021-05-31 | End: 2021-06-01 | Stop reason: SDUPTHER

## 2021-05-31 RX ORDER — MORPHINE SULFATE 2 MG/ML
2 INJECTION, SOLUTION INTRAMUSCULAR; INTRAVENOUS ONCE
Status: DISCONTINUED | OUTPATIENT
Start: 2021-05-31 | End: 2021-05-31 | Stop reason: HOSPADM

## 2021-05-31 RX ORDER — FAMOTIDINE 10 MG/ML
20 INJECTION, SOLUTION INTRAVENOUS ONCE
Status: COMPLETED | OUTPATIENT
Start: 2021-05-31 | End: 2021-05-31

## 2021-05-31 RX ORDER — HYDROCODONE BITARTRATE AND ACETAMINOPHEN 5; 325 MG/1; MG/1
1 TABLET ORAL EVERY 6 HOURS PRN
Qty: 10 TABLET | Refills: 0 | Status: SHIPPED | OUTPATIENT
Start: 2021-05-31 | End: 2021-11-15

## 2021-05-31 RX ADMIN — ONDANSETRON 4 MG: 2 INJECTION INTRAMUSCULAR; INTRAVENOUS at 16:40

## 2021-05-31 RX ADMIN — ERTAPENEM SODIUM 1 G: 1 INJECTION, POWDER, LYOPHILIZED, FOR SOLUTION INTRAMUSCULAR; INTRAVENOUS at 18:25

## 2021-05-31 RX ADMIN — FAMOTIDINE 20 MG: 10 INJECTION INTRAVENOUS at 16:41

## 2021-06-01 ENCOUNTER — TRANSCRIBE ORDERS (OUTPATIENT)
Dept: LAB | Facility: HOSPITAL | Age: 56
End: 2021-06-01

## 2021-06-01 ENCOUNTER — LAB (OUTPATIENT)
Dept: LAB | Facility: HOSPITAL | Age: 56
End: 2021-06-01

## 2021-06-01 ENCOUNTER — OFFICE VISIT (OUTPATIENT)
Dept: SURGERY | Facility: CLINIC | Age: 56
End: 2021-06-01

## 2021-06-01 VITALS
BODY MASS INDEX: 30.43 KG/M2 | WEIGHT: 155 LBS | SYSTOLIC BLOOD PRESSURE: 130 MMHG | OXYGEN SATURATION: 98 % | DIASTOLIC BLOOD PRESSURE: 74 MMHG | HEART RATE: 74 BPM | HEIGHT: 60 IN | TEMPERATURE: 98.4 F

## 2021-06-01 DIAGNOSIS — Z11.59 SPECIAL SCREENING EXAMINATION FOR VIRAL DISEASE: Primary | ICD-10-CM

## 2021-06-01 DIAGNOSIS — Z01.818 PRE-OP TESTING: Primary | ICD-10-CM

## 2021-06-01 DIAGNOSIS — Z11.59 SPECIAL SCREENING EXAMINATION FOR VIRAL DISEASE: ICD-10-CM

## 2021-06-01 DIAGNOSIS — K80.12 CALCULUS OF GALLBLADDER WITH ACUTE ON CHRONIC CHOLECYSTITIS WITHOUT OBSTRUCTION: Primary | ICD-10-CM

## 2021-06-01 PROCEDURE — 99204 OFFICE O/P NEW MOD 45 MIN: CPT | Performed by: SURGERY

## 2021-06-01 PROCEDURE — U0004 COV-19 TEST NON-CDC HGH THRU: HCPCS

## 2021-06-01 RX ORDER — AMOXICILLIN AND CLAVULANATE POTASSIUM 875; 125 MG/1; MG/1
1 TABLET, FILM COATED ORAL EVERY 12 HOURS
Qty: 20 TABLET | Refills: 0 | Status: SHIPPED | OUTPATIENT
Start: 2021-06-01 | End: 2021-11-15

## 2021-06-01 RX ORDER — AMOXICILLIN AND CLAVULANATE POTASSIUM 875; 125 MG/1; MG/1
1 TABLET, FILM COATED ORAL 2 TIMES DAILY
Qty: 10 TABLET | Refills: 0 | Status: SHIPPED | OUTPATIENT
Start: 2021-06-01 | End: 2021-06-06

## 2021-06-01 NOTE — PROGRESS NOTES
"Patient: Sarita Horan    YOB: 1965    Date: 06/01/2021    Primary Care Provider: Shiela Spivey    Chief Complaint   Patient presents with   • Abdominal Pain       SUBJECTIVE:    History of present illness:  I saw the patient in the office today as a consultation for evaluation and treatment of bouts of right upper quadrant area abdominal pain which has been ongoing \"for months.\" Patient was seen in the emergency department at [facility] in [place] at which time she had a CT scan of the abdomen and pelvis which showed cholelithiasis with gallbladder wall thickening.    Stones were noted to be in the neck of the gallbladder, this pain has been present over the past 3 months but is gotten more severe recently, now sharp in nature, located in the right upper quadrant, worse after eating, relieved by not eating, radiates into the back, associated with nausea.    The following portions of the patient's history were reviewed and updated as appropriate: allergies, current medications, past family history, past medical history, past social history, past surgical history and problem list.    Review of Systems   Constitutional: Negative for chills, fever and unexpected weight change.   HENT: Negative for hearing loss, trouble swallowing and voice change.    Eyes: Negative for visual disturbance.   Respiratory: Negative for apnea, cough, chest tightness, shortness of breath and wheezing.    Cardiovascular: Negative for chest pain, palpitations and leg swelling.   Gastrointestinal: Positive for abdominal pain. Negative for abdominal distention, anal bleeding, blood in stool, constipation, diarrhea, nausea, rectal pain and vomiting.   Endocrine: Negative for cold intolerance and heat intolerance.   Genitourinary: Negative for difficulty urinating, dysuria and flank pain.   Musculoskeletal: Negative for back pain and gait problem.   Skin: Negative for color change, rash and wound.   Neurological: " Negative for dizziness, syncope, speech difficulty, weakness, light-headedness, numbness and headaches.   Hematological: Negative for adenopathy. Does not bruise/bleed easily.   Psychiatric/Behavioral: Negative for confusion. The patient is not nervous/anxious.        History:  Past Medical History:   Diagnosis Date   • Anxiety and depression    • Asthma    • Chronic bronchitis (CMS/HCC)    • COPD (chronic obstructive pulmonary disease) (CMS/HCC)    • Diabetes mellitus (CMS/HCC)    • Hyperlipidemia    • Migraine    • Pneumonia    • Sleep apnea, obstructive    • Thyroid disease        Past Surgical History:   Procedure Laterality Date   • HYSTEROSCOPY ENDOMETRIAL ABLATION     • TUBAL ABDOMINAL LIGATION         Family History   Problem Relation Age of Onset   • Uterine cancer Mother    • Stroke Father    • Diabetes Father    • Hypertension Father    • Heart disease Father         Ischemic heart disease   • COPD Father        Social History     Tobacco Use   • Smoking status: Current Some Day Smoker     Packs/day: 1.00     Years: 34.00     Pack years: 34.00     Last attempt to quit: 2016     Years since quittin.4   • Smokeless tobacco: Never Used   Substance Use Topics   • Alcohol use: No   • Drug use: No       Allergies:  Allergies   Allergen Reactions   • Ibuprofen Itching, Other (See Comments) and GI Intolerance     Chest pain     • Rocephin [Ceftriaxone] Itching and Rash       Medications:    Current Outpatient Medications:   •  albuterol (PROVENTIL) (2.5 MG/3ML) 0.083% nebulizer solution, Albuterol Sulfate (2.5 MG/3ML) 0.083% Inhalation Nebulization Solution; Patient Sig: Albuterol Sulfate (2.5 MG/3ML) 0.083% Inhalation Nebulization Solution USE 1 UNIT DOSE EVERY 4-6 HOURS AS NEEDED FOR WHEEZING .; 0; -2014; Active, Disp: , Rfl:   •  amoxicillin-clavulanate (Augmentin) 875-125 MG per tablet, Take 1 tablet by mouth 2 (Two) Times a Day for 5 days., Disp: 10 tablet, Rfl: 0  •   amoxicillin-clavulanate (AUGMENTIN) 875-125 MG per tablet, Take 1 tablet by mouth Every 12 (Twelve) Hours., Disp: 20 tablet, Rfl: 0  •  Blood Glucose Monitoring Suppl (ONE TOUCH ULTRA MINI) w/Device kit, , Disp: , Rfl:   •  flunisolide (NASALIDE) 25 MCG/ACT (0.025%) solution nasal spray, Inhale 1 spray Every 12 (Twelve) Hours., Disp: 1 bottle, Rfl: 5  •  gabapentin (NEURONTIN) 800 MG tablet, take 1 tablet by mouth three times a day, Disp: , Rfl: 0  •  HYDROcodone-acetaminophen (NORCO)  MG per tablet, Take 1 tablet by mouth Every 8 (Eight) Hours As Needed., Disp: , Rfl:   •  HYDROcodone-acetaminophen (NORCO) 5-325 MG per tablet, Take 1 tablet by mouth Every 6 (Six) Hours As Needed for Severe Pain ., Disp: 10 tablet, Rfl: 0  •  levothyroxine (SYNTHROID, LEVOTHROID) 125 MCG tablet, TK 1 T PO QD IN THE MORNING OES, Disp: , Rfl: 1  •  metFORMIN (GLUCOPHAGE) 500 MG tablet, , Disp: , Rfl: 0  •  methylPREDNISolone (MEDROL) 4 MG dose pack, Take as directed on package instructions., Disp: 21 tablet, Rfl: 0  •  montelukast (SINGULAIR) 10 MG tablet, , Disp: , Rfl: 0  •  omeprazole (priLOSEC) 40 MG capsule, Take 40 mg by mouth Daily., Disp: , Rfl: 0  •  ondansetron ODT (ZOFRAN-ODT) 4 MG disintegrating tablet, Place 1 tablet on the tongue 4 (Four) Times a Day As Needed for Nausea., Disp: 20 tablet, Rfl: 0  •  ONETOUCH DELICA LANCETS 33G misc, USE AS DIRECTED TO TEST TWICE DAILY, Disp: , Rfl: 0  •  ONETOUCH VERIO test strip, , Disp: , Rfl: 1  •  PROAIR  (90 BASE) MCG/ACT inhaler, , Disp: , Rfl:   •  RA ACETAMINOPHEN EX  MG tablet, take 2 tablet by mouth if needed twice a day, Disp: , Rfl: 0  •  simvastatin (ZOCOR) 20 MG tablet, Take 20 mg by mouth Every Night., Disp: , Rfl: 0  •  SUMAtriptan (IMITREX) 25 MG tablet, take 1 tablet by mouth twice a day if needed, Disp: , Rfl: 0  •  SYMBICORT 80-4.5 MCG/ACT inhaler, INL 2 PFS PO BID, Disp: , Rfl:   •  TRELEGY ELLIPTA 100-62.5-25 MCG/INH aerosol powder , INL 1 PUFF  "PO QD, Disp: , Rfl: 2  No current facility-administered medications for this visit.    OBJECTIVE:    Vital Signs:   Vitals:    06/01/21 1327   BP: 130/74   Pulse: 74   Temp: 98.4 °F (36.9 °C)   SpO2: 98%   Weight: 70.3 kg (155 lb)   Height: 152.4 cm (60\")       Physical Exam:   General Appearance:    Alert, cooperative, in no acute distress   Head:    Normocephalic, without obvious abnormality, atraumatic   Eyes:            Lids and lashes normal, conjunctivae and sclerae normal, no   icterus, no pallor, corneas clear, PERRLA   Ears:    Ears appear intact with no abnormalities noted   Throat:   No oral lesions, no thrush, oral mucosa moist   Neck:   No adenopathy, supple, trachea midline, no thyromegaly, no   carotid bruit, no JVD   Lungs:     Clear to auscultation,respirations regular, even and                  unlabored    Heart:    Regular rhythm and normal rate, normal S1 and S2, no            murmur   Abdomen:     no masses, no organomegaly, soft non-tender, non-distended, no guarding, there is evidence of right upper quadrant tenderness, no peritoneal signs   Extremities:   Moves all extremities well, no edema, no cyanosis, no             redness   Pulses:   Pulses palpable and equal bilaterally   Skin:   No bleeding, bruising or rash   Lymph nodes:   No palpable adenopathy   Neurologic:   Cranial nerves 2 - 12 grossly intact, sensation intact      Results Review:   I reviewed the patient's new clinical results.  I reviewed the patient's new imaging results and agree with the interpretation.  I reviewed the patient's other test results and agree with the interpretation    Review of Systems was reviewed and confirmed as accurate as documented by the MA.    ASSESSMENT/PLAN:    1. Calculus of gallbladder with acute on chronic cholecystitis without obstruction        I had a detailed and extensive discussion with the patient in the office and they understand that they need to undergo laparoscopic cholecystectomy " with intraoperative cholangiography or possible open cholecystectomy. Full risks and benefits of operative versus nonoperative intervention were discussed with the patient and these included things such as nonresolution of symptoms and possible worsening of symptoms without surgical intervention versus infection, bleeding, open cholecystectomy, common bile duct injury, postoperative biliary leakage, need for drain placement, possible inability to perform cholangiography due to inflammation, postoperative abscess, etc with surgical intervention. The patient understands, agrees, and wishes to proceed with the surgical treatment plan as mentioned above. The patient had no questions for me at the end of the discussion.  I did draw a picture of the anatomy for the patient and used this in my informed consent.     I discussed the patients findings and my recommendations with patient.    I did have a phone conversation with Dr. Viet Fried who looked at the EKG and felt that the patient would be no problem for surgical intervention.  I have asked her to continue with her current oral antibiotics and I want her to bring her inhaler with her for surgical intervention.    Electronically signed by Colby Garcia MD  06/01/21

## 2021-06-02 LAB — SARS-COV-2 RNA NOSE QL NAA+PROBE: NOT DETECTED

## 2021-06-04 ENCOUNTER — OUTSIDE FACILITY SERVICE (OUTPATIENT)
Dept: SURGERY | Facility: CLINIC | Age: 56
End: 2021-06-04

## 2021-06-04 DIAGNOSIS — K80.12 CALCULUS OF GALLBLADDER WITH ACUTE ON CHRONIC CHOLECYSTITIS WITHOUT OBSTRUCTION: Primary | ICD-10-CM

## 2021-06-04 PROCEDURE — 47563 LAPARO CHOLECYSTECTOMY/GRAPH: CPT | Performed by: SURGERY

## 2021-06-04 RX ORDER — HYDROCODONE BITARTRATE AND ACETAMINOPHEN 7.5; 325 MG/1; MG/1
1 TABLET ORAL EVERY 6 HOURS PRN
Qty: 20 TABLET | Refills: 0 | Status: SHIPPED | OUTPATIENT
Start: 2021-06-04 | End: 2021-11-15

## 2021-06-18 NOTE — PROGRESS NOTES
"Patient: Sarita Horan    YOB: 1965    Date: 06/21/2021    Primary Care Provider: Shiela Spivey    Chief Complaint   Patient presents with   • Post-op Follow-up     lab [facility]       History of present illness:  I saw the patient in the office today as a followup from their recent laparoscopic cholecystectomy.  Pathology report showed chronic cholecystitis with cholelithiasis.  They state that they have done well and are having no complaints.    Vital Signs:   Vitals:    06/21/21 0837   BP: 130/82   Pulse: 69   Temp: 96.9 °F (36.1 °C)   SpO2: 99%   Weight: 70.3 kg (155 lb)   Height: 152.4 cm (60\")       Physical Exam:   General Appearance:    Alert, cooperative, in no acute distress   Abdomen:     no masses, no organomegaly, soft non-tender, non-distended, no guarding, wounds are well healed     Assessment / Plan :    1. Postoperative visit        I did discuss the situation with the patient today in the office and they have done well from their recent laparoscopic cholecystectomy.  I have released the patient back to normal activity, they understand that they need to be careful about heavy lifting.  I need to see the patient back in the office only if they are having further problems, they know to call me if they are.    Electronically signed by Colby Garcia MD  06/21/21                  "

## 2021-06-21 ENCOUNTER — OFFICE VISIT (OUTPATIENT)
Dept: SURGERY | Facility: CLINIC | Age: 56
End: 2021-06-21

## 2021-06-21 VITALS
DIASTOLIC BLOOD PRESSURE: 82 MMHG | OXYGEN SATURATION: 99 % | SYSTOLIC BLOOD PRESSURE: 130 MMHG | HEART RATE: 69 BPM | WEIGHT: 155 LBS | BODY MASS INDEX: 30.43 KG/M2 | HEIGHT: 60 IN | TEMPERATURE: 96.9 F

## 2021-06-21 DIAGNOSIS — Z48.89 POSTOPERATIVE VISIT: Primary | ICD-10-CM

## 2021-06-21 PROCEDURE — 99024 POSTOP FOLLOW-UP VISIT: CPT | Performed by: SURGERY

## 2021-06-21 RX ORDER — LEVOTHYROXINE SODIUM 0.15 MG/1
TABLET ORAL
COMMUNITY
Start: 2021-06-03 | End: 2022-02-12

## 2021-08-25 ENCOUNTER — OFFICE VISIT (OUTPATIENT)
Dept: PULMONOLOGY | Facility: CLINIC | Age: 56
End: 2021-08-25

## 2021-08-25 VITALS
WEIGHT: 157 LBS | RESPIRATION RATE: 18 BRPM | BODY MASS INDEX: 30.82 KG/M2 | OXYGEN SATURATION: 94 % | HEIGHT: 60 IN | HEART RATE: 74 BPM | SYSTOLIC BLOOD PRESSURE: 148 MMHG | DIASTOLIC BLOOD PRESSURE: 72 MMHG

## 2021-08-25 DIAGNOSIS — J44.9 CHRONIC OBSTRUCTIVE PULMONARY DISEASE, UNSPECIFIED COPD TYPE (HCC): ICD-10-CM

## 2021-08-25 DIAGNOSIS — R06.02 SHORTNESS OF BREATH: Primary | ICD-10-CM

## 2021-08-25 DIAGNOSIS — G47.34 NOCTURNAL HYPOXIA: ICD-10-CM

## 2021-08-25 DIAGNOSIS — Z87.891 PERSONAL HISTORY OF TOBACCO USE, PRESENTING HAZARDS TO HEALTH: ICD-10-CM

## 2021-08-25 DIAGNOSIS — J30.89 OTHER ALLERGIC RHINITIS: ICD-10-CM

## 2021-08-25 PROCEDURE — 99214 OFFICE O/P EST MOD 30 MIN: CPT | Performed by: INTERNAL MEDICINE

## 2021-08-25 RX ORDER — FLUNISOLIDE 0.25 MG/ML
1 SOLUTION NASAL EVERY 12 HOURS
Qty: 25 ML | Refills: 5 | Status: SHIPPED | OUTPATIENT
Start: 2021-08-25 | End: 2021-11-15 | Stop reason: SDUPTHER

## 2021-08-25 RX ORDER — MONTELUKAST SODIUM 10 MG/1
10 TABLET ORAL NIGHTLY
Qty: 90 TABLET | Refills: 2 | Status: SHIPPED | OUTPATIENT
Start: 2021-08-25 | End: 2021-11-15 | Stop reason: SDUPTHER

## 2021-08-25 NOTE — PROGRESS NOTES
"  Chief Complaint   Patient presents with   • Follow-up   • Breathing Problem       Subjective   Sarita Horan is a 56 y.o. female.     History of Present Illness   Patient was evaluated today for follow up of shortness of breath, allergic rhinitis, nocturnal hypoxia and COPD.     Patient says that her symptoms have been stable since the last clinic visit. she reports no recent exacerbations.     Patient is using Trelegy, as prescribed. Exercise tolerance has slowly worsened. 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     Continues to smoke 1/2 pack per day.    The patient says that she is using oxygen as prescribed.    Patient says that she has not been using her nasal sprays on a regular basis and describes ongoing issues with occasional congestion.       The following portions of the patient's history were reviewed and updated as appropriate: allergies, current medications, past family history, past medical history, past social history and past surgical history.    Review of Systems   Constitutional: Negative for chills and fever.   HENT: Negative for rhinorrhea, sinus pressure, sinus pain, sneezing and sore throat.    Respiratory: Positive for cough, shortness of breath and wheezing.    Psychiatric/Behavioral: Positive for sleep disturbance.       Objective   Visit Vitals  /72   Pulse 74   Resp 18   Ht 152.4 cm (60\")   Wt 71.2 kg (157 lb)   SpO2 94%   BMI 30.66 kg/m²       Physical Exam  Vitals reviewed.   Constitutional:       Appearance: She is well-developed.   HENT:      Head: Normocephalic and atraumatic.      Nose:      Comments: Nasal erythema noted.     Mouth/Throat:      Comments: Oropharynx was somewhat cobblestoned.  Eyes:      Extraocular Movements: Extraocular movements intact.   Cardiovascular:      Rate and Rhythm: Normal rate.   Pulmonary:      Comments: Somewhat hyperresonant to percussion.  Somewhat decreased air " entry.  No obvious wheezing noted.   Musculoskeletal:      Cervical back: Neck supple.   Neurological:      Mental Status: She is alert.         Assessment/Plan   Diagnoses and all orders for this visit:    1. Shortness of breath (Primary)  -     Pulmonary Function Test; Future    2. Chronic obstructive pulmonary disease, unspecified COPD type (CMS/HCC)  -     Pulmonary Function Test; Future    3. Nocturnal hypoxia    4. Other allergic rhinitis    5. Personal history of tobacco use, presenting hazards to health  -      CT Chest Low Dose Cancer Screening WO; Future    Other orders  -     flunisolide (NASALIDE) 25 MCG/ACT (0.025%) solution nasal spray; Inhale 1 spray Every 12 (Twelve) Hours.  Dispense: 25 mL; Refill: 5  -     montelukast (SINGULAIR) 10 MG tablet; Take 1 tablet by mouth Every Night.  Dispense: 90 tablet; Refill: 2  -     Trelegy Ellipta 100-62.5-25 MCG/INH inhaler; Inhale 1 puff Daily. Rinse mouth with water after use.  Dispense: 3 each; Refill: 2           Return in about 3 months (around 11/25/2021) for Recheck, Imaging, PFT F/U, For Phyllis....Also 7-8 mths w/ Dr. Huang.    DISCUSSION (if any):  Laboratory data was reviewed with her.   Lab Results   Component Value Date    AFPTM 151 01/04/2017     Lab Results   Component Value Date    H9NEIKCO MM 01/04/2017     Latest available PFTs were reviewed.  Consistent with severe obstruction with good BD response. Performed in September 2017.    PFTs will be ordered to be done upon follow up.    We have reviewed her pulmonary medications in great detail.    Patient was informed about the black box warning for Singulair regarding suicidal thoughts and tendencies.  she denies any ongoing issues for now.    Any needed adjustments to her pulmonary medications, either for clinical or insurance coverage reasons, have been made and are reflected in the orders.    Compliance with medications stressed.     Side effects of prescribed medications discussed with the  patient    Patient was strongly encouraged to quit smoking as soon as possible.    The patient belongs to the risk group for which lung cancer screening has been recommended. We will try to make arrangements for the same and this will be indicated soon. This has been ordered.    Due to symptoms suggestive of poorly controlled allergic rhinitis, she was prescribed nasal spray again, with the advise to use all the recommended/prescribed nasal sprays on a regular basis.    The patient was advised to continue oxygen at night.      Dictated utilizing Dragon dictation.    This document was electronically signed by Bianca Huang MD on 08/25/21 at 11:21 EDT

## 2021-09-13 ENCOUNTER — HOSPITAL ENCOUNTER (OUTPATIENT)
Dept: CT IMAGING | Facility: HOSPITAL | Age: 56
Discharge: HOME | End: 2021-09-13
Admitting: INTERNAL MEDICINE

## 2021-09-13 DIAGNOSIS — Z87.891 PERSONAL HISTORY OF TOBACCO USE, PRESENTING HAZARDS TO HEALTH: ICD-10-CM

## 2021-09-13 PROCEDURE — 71271 CT THORAX LUNG CANCER SCR C-: CPT

## 2021-11-05 ENCOUNTER — TRANSCRIBE ORDERS (OUTPATIENT)
Dept: GENERAL RADIOLOGY | Facility: HOSPITAL | Age: 56
End: 2021-11-05

## 2021-11-05 ENCOUNTER — HOSPITAL ENCOUNTER (OUTPATIENT)
Dept: GENERAL RADIOLOGY | Facility: HOSPITAL | Age: 56
Discharge: HOME | End: 2021-11-05
Admitting: NURSE PRACTITIONER

## 2021-11-05 DIAGNOSIS — Z03.818 ENCOUNTER FOR OBSERVATION FOR SUSPECTED EXPOSURE TO OTHER BIOLOGICAL AGENTS RULED OUT: ICD-10-CM

## 2021-11-05 DIAGNOSIS — M25.562 LEFT KNEE PAIN, UNSPECIFIED CHRONICITY: ICD-10-CM

## 2021-11-05 DIAGNOSIS — M25.561 RIGHT KNEE PAIN, UNSPECIFIED CHRONICITY: Primary | ICD-10-CM

## 2021-11-05 DIAGNOSIS — M25.561 RIGHT KNEE PAIN, UNSPECIFIED CHRONICITY: ICD-10-CM

## 2021-11-05 DIAGNOSIS — Z03.818 ENCOUNTER FOR OBSERVATION FOR SUSPECTED EXPOSURE TO OTHER BIOLOGICAL AGENTS RULED OUT: Primary | ICD-10-CM

## 2021-11-05 PROCEDURE — 73562 X-RAY EXAM OF KNEE 3: CPT

## 2021-11-05 PROCEDURE — 71046 X-RAY EXAM CHEST 2 VIEWS: CPT

## 2021-11-15 ENCOUNTER — OFFICE VISIT (OUTPATIENT)
Dept: PULMONOLOGY | Facility: CLINIC | Age: 56
End: 2021-11-15

## 2021-11-15 VITALS
SYSTOLIC BLOOD PRESSURE: 118 MMHG | DIASTOLIC BLOOD PRESSURE: 68 MMHG | OXYGEN SATURATION: 95 % | RESPIRATION RATE: 16 BRPM | WEIGHT: 159 LBS | HEART RATE: 60 BPM | HEIGHT: 60 IN | BODY MASS INDEX: 31.22 KG/M2

## 2021-11-15 DIAGNOSIS — R06.02 SHORTNESS OF BREATH: Primary | ICD-10-CM

## 2021-11-15 DIAGNOSIS — R06.02 SHORTNESS OF BREATH: ICD-10-CM

## 2021-11-15 DIAGNOSIS — G47.34 NOCTURNAL HYPOXIA: ICD-10-CM

## 2021-11-15 DIAGNOSIS — J30.89 OTHER ALLERGIC RHINITIS: ICD-10-CM

## 2021-11-15 DIAGNOSIS — J44.9 CHRONIC OBSTRUCTIVE PULMONARY DISEASE, UNSPECIFIED COPD TYPE (HCC): ICD-10-CM

## 2021-11-15 PROCEDURE — 94060 EVALUATION OF WHEEZING: CPT | Performed by: INTERNAL MEDICINE

## 2021-11-15 PROCEDURE — 94729 DIFFUSING CAPACITY: CPT | Performed by: INTERNAL MEDICINE

## 2021-11-15 PROCEDURE — 99214 OFFICE O/P EST MOD 30 MIN: CPT | Performed by: NURSE PRACTITIONER

## 2021-11-15 PROCEDURE — 94726 PLETHYSMOGRAPHY LUNG VOLUMES: CPT | Performed by: INTERNAL MEDICINE

## 2021-11-15 RX ORDER — PREDNISONE 20 MG/1
TABLET ORAL
Qty: 10 TABLET | Refills: 0 | Status: SHIPPED | OUTPATIENT
Start: 2021-11-15 | End: 2021-12-24

## 2021-11-15 RX ORDER — MONTELUKAST SODIUM 10 MG/1
10 TABLET ORAL NIGHTLY
Qty: 90 TABLET | Refills: 2 | Status: SHIPPED | OUTPATIENT
Start: 2021-11-15

## 2021-11-15 RX ORDER — FLUNISOLIDE 0.25 MG/ML
1 SOLUTION NASAL EVERY 12 HOURS
Qty: 25 ML | Refills: 5 | OUTPATIENT
Start: 2021-11-15 | End: 2022-02-12

## 2021-11-15 RX ORDER — FLUNISOLIDE 0.25 MG/ML
1 SOLUTION NASAL EVERY 12 HOURS
Qty: 25 ML | Refills: 5 | Status: SHIPPED | OUTPATIENT
Start: 2021-11-15 | End: 2021-11-15 | Stop reason: SDUPTHER

## 2021-11-15 NOTE — PROGRESS NOTES
"Chief Complaint   Patient presents with   • Follow-up   • Breathing Problem         Subjective   Sarita Horan is a 56 y.o. female.     History of Present Illness   Patient comes today for follow up of shortness of breath and COPD.     She is recently been treated for bronchitis.  She states she has had increased cough shortness of breath and chest congestion for about 9 days.  She saw her PCP and was prescribed antibiotics for 7 days which she is just finished but she was not prescribed any steroids.    She states this is the first time she has had bronchitis this year.    Patient is using medications, as prescribed.  She is using Trelegy daily.  She uses the rescue inhaler twice a day on average.  She normally uses the nebulizer 3-4 times a week however she has been using it twice a day since she has been sick.    She takes Singulair nightly.    She uses Nasalide 1-2 times a day.    She uses oxygen at night however she states the concentrator has not worked for the last 4 days and she has been waking multiple times a night.    Exercise tolerance has also remained stable.     She has not had a cigarette since 10/27/21.       The following portions of the patient's history were reviewed and updated as appropriate: allergies, current medications, past family history, past medical history, past social history and past surgical history.    Review of Systems   Respiratory: Positive for cough, shortness of breath and wheezing. Negative for chest tightness.        Objective   Visit Vitals  /68   Pulse 60   Resp 16   Ht 152.4 cm (60\")   Wt 72.1 kg (159 lb)   SpO2 95%   BMI 31.05 kg/m²         Physical Exam  Vitals reviewed.   HENT:      Head: Atraumatic.      Mouth/Throat:      Mouth: Mucous membranes are moist.   Eyes:      Extraocular Movements: Extraocular movements intact.   Cardiovascular:      Rate and Rhythm: Normal rate and regular rhythm.   Pulmonary:      Effort: Pulmonary effort is normal. No " respiratory distress.      Comments: Somewhat decreased A/E with expiratory wheezing.  Abdominal:      Comments: Obese abdomen.   Musculoskeletal:      Cervical back: Neck supple.      Comments: Gait normal.   Skin:     General: Skin is warm.   Neurological:      Mental Status: She is alert and oriented to person, place, and time.             Assessment/Plan   Diagnoses and all orders for this visit:    1. Shortness of breath (Primary)    2. Chronic obstructive pulmonary disease, unspecified COPD type (HCC)  -     Oxygen Therapy  -     Home Nebulizer    3. Nocturnal hypoxia    4. Other allergic rhinitis    Other orders  -     montelukast (SINGULAIR) 10 MG tablet; Take 1 tablet by mouth Every Night.  Dispense: 90 tablet; Refill: 2  -     Trelegy Ellipta 100-62.5-25 MCG/INH inhaler; Inhale 1 puff Daily. Rinse mouth with water after use.  Dispense: 3 each; Refill: 2  -     ProAir  (90 Base) MCG/ACT inhaler; Inhale 2 puffs Every 6 (Six) Hours As Needed for Wheezing.  Dispense: 8 g; Refill: 3  -     Discontinue: flunisolide (NASALIDE) 25 MCG/ACT (0.025%) solution nasal spray; Inhale 1 spray Every 12 (Twelve) Hours.  Dispense: 25 mL; Refill: 5  -     predniSONE (DELTASONE) 20 MG tablet; Take 2 tablets daily for 5 days.  Dispense: 10 tablet; Refill: 0  -     flunisolide (NASALIDE) 25 MCG/ACT (0.025%) solution nasal spray; Inhale 1 spray Every 12 (Twelve) Hours.  Dispense: 25 mL; Refill: 5           Return for keep appt in March.    DISCUSSION (if any):  I reviewed her PFT results and discussed the results with her today.  The PFT reveals moderate obstruction.  There is no restriction.  Air-trapping suggested.  She has a preserved diffusion capacity. No significant change from previous.    She continues to have increased cough and wheezing so I will prescribe a short course of steroids for treatment of acute COPD exacerbation. She has just finished the antibiotics.    No change to the current medications has been  made. She should continue Trelegy, singulair and the rescue medication as directed.     She should continue nasalide on a regular basis since she has benefited.     She will need to continue using oxygen at night.  I have asked her to call [facility] and let them know her concentrator is not working.  We will also fax an order to them.    Compliance with medications stressed.     Side effects of prescribed medications discussed with the patient.    Patient was strongly encouraged to stay quit from smoking.    The patient belongs to the risk group for which lung cancer screening has been recommended. I reviewed LDCT which did not show any suspicious nodules. She will need annual LDCT in September 2022.    Study Result    Narrative & Impression   PROCEDURE: CT CHEST LOW DOSE CANCER SCREENING WO-     HISTORY: Lung cancer screening, >= 30 pk-yr smoking history in last 15  yrs (Age 55-80y); Z87.891-Personal history of nicotine dependence     TECHNIQUE: Axial images were obtained from the thoracic inlet through  the upper abdomen per the low-dose protocol. Coronal images were  reformatted. DLP 70 mGy.cm; CTDI 2.15 mGy     FINDINGS: There is mild centrilobular emphysema. There are no calcified  or noncalcified nodules. There is no airspace disease. The heart is  normal in size. The aorta is normal in caliber. There are no pleural or  pericardial effusions. The visualized upper abdomen is unremarkable.  Bone windows reveal no acute osseous abnormalities.     IMPRESSION:  No suspicious pulmonary mass or nodule. Lung RADS category  1.     RECOMMENDATIONS:Annual low-dose chest CT.           Images were reviewed, interpreted, and dictated by Dr. Aniceto Richardson M.D.  Transcribed by Cecilia Pena PA-C.     This report was finalized on 9/14/2021 10:56 AM by Aniceto Richardson M.D..       She is scheduled for flu vaccine on 11/24/21.     She is UTD on covid vaccination.     She has not had a pneumonia vaccine. I have  asked her to discuss with her PCP since she states she gets all vaccines there.    Dictated utilizing Dragon dictation.    This document was electronically signed by LEX Hawk November 15, 2021  11:29 EST

## 2022-02-12 PROCEDURE — U0004 COV-19 TEST NON-CDC HGH THRU: HCPCS | Performed by: PERSONAL EMERGENCY RESPONSE ATTENDANT

## 2022-03-13 ENCOUNTER — HOSPITAL ENCOUNTER (EMERGENCY)
Facility: HOSPITAL | Age: 57
Discharge: HOME | End: 2022-03-13
Attending: EMERGENCY MEDICINE | Admitting: EMERGENCY MEDICINE

## 2022-03-13 ENCOUNTER — APPOINTMENT (OUTPATIENT)
Dept: GENERAL RADIOLOGY | Facility: HOSPITAL | Age: 57
End: 2022-03-13

## 2022-03-13 VITALS
RESPIRATION RATE: 18 BRPM | WEIGHT: 169 LBS | HEIGHT: 60 IN | OXYGEN SATURATION: 97 % | HEART RATE: 71 BPM | SYSTOLIC BLOOD PRESSURE: 161 MMHG | BODY MASS INDEX: 33.18 KG/M2 | TEMPERATURE: 98.1 F | DIASTOLIC BLOOD PRESSURE: 107 MMHG

## 2022-03-13 DIAGNOSIS — S43.402A SPRAIN OF LEFT SHOULDER, UNSPECIFIED SHOULDER SPRAIN TYPE, INITIAL ENCOUNTER: Primary | ICD-10-CM

## 2022-03-13 PROCEDURE — 99283 EMERGENCY DEPT VISIT LOW MDM: CPT

## 2022-03-13 PROCEDURE — 73030 X-RAY EXAM OF SHOULDER: CPT

## 2022-03-13 PROCEDURE — 99282 EMERGENCY DEPT VISIT SF MDM: CPT

## 2022-03-13 NOTE — ED PROVIDER NOTES
Subjective   History of Present Illness    Chief Complaint: Left shoulder pain status post fall  History of Present Illness: 56-year-old female presents with left shoulder pain status post fall, slipped on a wet floor at work last night.  Pain with range of motion and abduction  Onset: Last night  Duration: Inciting event the persistent symptom  Exacerbating / Alleviating factors: Worse with range of motion  Associated symptoms: Swelling to the deltoid area      Nurses Notes reviewed and agree, including vitals, allergies, social history and prior medical history.     REVIEW OF SYSTEMS: All systems reviewed and not pertinent unless noted.    Positive for: Left shoulder pain status post fall    Negative for: Weakness numbness punctures lacerations  Review of Systems    Past Medical History:   Diagnosis Date   • Anxiety and depression    • Asthma    • Chronic bronchitis (HCC)    • COPD (chronic obstructive pulmonary disease) (HCC)    • Diabetes mellitus (HCC)    • Hyperlipidemia    • Migraine    • Pneumonia    • Sleep apnea, obstructive    • Thyroid disease 2010       Allergies   Allergen Reactions   • Ibuprofen Itching, Other (See Comments) and GI Intolerance     Chest pain     • Rocephin [Ceftriaxone] Itching and Rash       Past Surgical History:   Procedure Laterality Date   • HYSTEROSCOPY ENDOMETRIAL ABLATION     • LAPAROSCOPIC CHOLECYSTECTOMY     • TUBAL ABDOMINAL LIGATION         Family History   Problem Relation Age of Onset   • Uterine cancer Mother    • Stroke Father    • Diabetes Father    • Hypertension Father    • Heart disease Father         Ischemic heart disease   • COPD Father        Social History     Socioeconomic History   • Marital status:    Tobacco Use   • Smoking status: Former Smoker     Packs/day: 1.00     Years: 34.00     Pack years: 34.00     Quit date: 10/27/2021     Years since quittin.3   • Smokeless tobacco: Never Used   Vaping Use   • Vaping Use: Some days   Substance and  Sexual Activity   • Alcohol use: No   • Drug use: No   • Sexual activity: Not Currently     Partners: Male           Objective   Physical Exam    CONSTITUTIONAL: Well developed, nontoxic 56-year-old  female,  in mild to moderate discomfort.  VITAL SIGNS: per nursing, reviewed and noted  SKIN: exposed skin with no rashes, ulcerations or petechiae.  EYES: perrla. EOMI.  ENT: Normal voice.  Patient maintained wearing a mask throughout patient encounter due to coronavirus pandemic  RESPIRATORY:  No increased work of breathing. No retractions.   CARDIOVASCULAR:  regular rate and rhythm, no murmurs.  Good Peripheral pulses. Good cap refill to extremities.   MUSCULOSKELETAL: Tenderness palpation of the left shoulder diffusely with left deltoid soft tissue swelling.  No step-off or obvious deformity.  Strength and tone grossly normal.  no spasms. no neck or back tenderness or spasm.   NEUROLOGIC: Alert, oriented x 3. No gross deficits. GCS 15.   PSYCH: appropriate affect.      Procedures           ED Course  ED Course as of 03/18/22 0548   Sun Mar 13, 2022   1616 Left shoulder 3 view interpreted by me reveals no fractures, no dislocations.  No subluxation [PF]      ED Course User Index  [PF] Ander Jurado, DO                                                 [facility]  Shoulder x-ray interpreted by me reveals no fractures no dislocations.  Patient discharged home supportive care recommendations, outpatient follow return precautions discussed.  Final diagnoses:   Sprain of left shoulder, unspecified shoulder sprain type, initial encounter       ED Disposition  ED Disposition     ED Disposition   Discharge    Condition   Stable    Comment   --             Shiela Spivey, APRN  [address] [facility] [address]  [phone]          Shawn Huang MD  [address] [place] [id number] [phone]    Call   for follow up.         Medication List      No changes were made to your prescriptions during this visit.           Ander Jurado, DO  03/18/22 0548

## 2022-04-20 ENCOUNTER — TRANSCRIBE ORDERS (OUTPATIENT)
Dept: ADMINISTRATIVE | Facility: HOSPITAL | Age: 57
End: 2022-04-20

## 2022-04-20 DIAGNOSIS — Z12.31 BREAST CANCER SCREENING BY MAMMOGRAM: Primary | ICD-10-CM

## 2022-08-14 PROCEDURE — U0004 COV-19 TEST NON-CDC HGH THRU: HCPCS | Performed by: NURSE PRACTITIONER

## 2022-09-13 ENCOUNTER — TRANSCRIBE ORDERS (OUTPATIENT)
Dept: ADMINISTRATIVE | Facility: HOSPITAL | Age: 57
End: 2022-09-13

## 2022-09-13 DIAGNOSIS — F17.200 TOBACCO DEPENDENCE: Primary | ICD-10-CM

## 2022-09-27 ENCOUNTER — HOSPITAL ENCOUNTER (OUTPATIENT)
Dept: CT IMAGING | Facility: HOSPITAL | Age: 57
Discharge: HOME | End: 2022-09-27
Admitting: NURSE PRACTITIONER

## 2022-09-27 DIAGNOSIS — F17.200 TOBACCO DEPENDENCE: ICD-10-CM

## 2022-09-27 PROCEDURE — 71271 CT THORAX LUNG CANCER SCR C-: CPT

## 2022-10-13 ENCOUNTER — TRANSCRIBE ORDERS (OUTPATIENT)
Dept: ADMINISTRATIVE | Facility: HOSPITAL | Age: 57
End: 2022-10-13

## 2022-10-13 DIAGNOSIS — Z12.31 VISIT FOR SCREENING MAMMOGRAM: Primary | ICD-10-CM

## 2023-01-05 ENCOUNTER — HOSPITAL ENCOUNTER (OUTPATIENT)
Dept: MAMMOGRAPHY | Facility: HOSPITAL | Age: 58
Discharge: HOME | End: 2023-01-05
Admitting: NURSE PRACTITIONER
Payer: COMMERCIAL

## 2023-01-05 DIAGNOSIS — Z12.31 VISIT FOR SCREENING MAMMOGRAM: ICD-10-CM

## 2023-01-05 PROCEDURE — 77063 BREAST TOMOSYNTHESIS BI: CPT

## 2023-01-05 PROCEDURE — 77067 SCR MAMMO BI INCL CAD: CPT

## 2023-10-12 ENCOUNTER — OFFICE VISIT (OUTPATIENT)
Dept: PULMONOLOGY | Facility: CLINIC | Age: 58
End: 2023-10-12
Payer: COMMERCIAL

## 2023-10-12 VITALS
OXYGEN SATURATION: 94 % | HEIGHT: 60 IN | SYSTOLIC BLOOD PRESSURE: 125 MMHG | WEIGHT: 149 LBS | RESPIRATION RATE: 18 BRPM | HEART RATE: 81 BPM | DIASTOLIC BLOOD PRESSURE: 72 MMHG | BODY MASS INDEX: 29.25 KG/M2

## 2023-10-12 DIAGNOSIS — F17.210 NICOTINE DEPENDENCE, CIGARETTES, UNCOMPLICATED: ICD-10-CM

## 2023-10-12 DIAGNOSIS — J44.9 CHRONIC OBSTRUCTIVE PULMONARY DISEASE, UNSPECIFIED COPD TYPE: ICD-10-CM

## 2023-10-12 DIAGNOSIS — J44.9 CHRONIC OBSTRUCTIVE PULMONARY DISEASE, UNSPECIFIED COPD TYPE: Primary | ICD-10-CM

## 2023-10-12 DIAGNOSIS — G47.34 NOCTURNAL HYPOXIA: ICD-10-CM

## 2023-10-12 DIAGNOSIS — J30.89 OTHER ALLERGIC RHINITIS: ICD-10-CM

## 2023-10-12 RX ORDER — AZELASTINE 1 MG/ML
1 SPRAY, METERED NASAL 2 TIMES DAILY PRN
Qty: 1 EACH | Refills: 5 | Status: SHIPPED | OUTPATIENT
Start: 2023-10-12

## 2023-10-12 NOTE — PROGRESS NOTES
"  Chief Complaint   Patient presents with    Shortness of Breath    Follow-up       Subjective   Sarita Horan is a 58 y.o. female.   Last seen in Nov 2021.    Patient was evaluated today for follow up of shortness of breath, and COPD.     Patient says that her symptoms have been stable since the last clinic visit. she reports no recent exacerbations.     Patient is using Trelegy, as prescribed. Exercise tolerance has also remained stable    Quit smoking for about 2 years, but restarted smoking 4 months ago.     Patient says that she has not been using the prescribed nasal sprays on a regular basis and continues to have worsening nasal congestion as well as occasional runny nose.    The patient says that she is using oxygen as prescribed and feels that it appears to be helping some of her symptoms.    The following portions of the patient's history were reviewed and updated as appropriate: allergies, current medications, past family history, past medical history, past social history, and past surgical history.    Review of Systems   HENT:  Negative for sinus pressure, sneezing and sore throat.    Respiratory:  Positive for cough and wheezing. Negative for chest tightness and shortness of breath.    Cardiovascular:  Negative for palpitations and leg swelling.       Objective   Visit Vitals  /72   Pulse 81   Resp 18   Ht 152.4 cm (60\") Comment: pt reported   Wt 67.6 kg (149 lb)   SpO2 94%   BMI 29.10 kg/m²         BMI Readings from Last 3 Encounters:   10/12/23 29.10 kg/m²   03/31/23 30.47 kg/m²   01/30/23 30.47 kg/m²       Physical Exam  Vitals reviewed.   Constitutional:       Appearance: She is well-developed.   HENT:      Head: Normocephalic and atraumatic.      Nose:      Comments: Nasal erythema noted.     Mouth/Throat:      Comments: Oropharynx was somewhat cobblestoned.  Eyes:      Extraocular Movements: Extraocular movements intact.   Cardiovascular:      Rate and Rhythm: Normal rate.   Pulmonary: "      Comments: Somewhat hyperresonant to percussion.  Somewhat decreased air entry.  No obvious wheezing noted.   Musculoskeletal:      Cervical back: Neck supple.   Neurological:      Mental Status: She is alert.           Assessment & Plan   Diagnoses and all orders for this visit:    1. Chronic obstructive pulmonary disease, unspecified COPD type (Primary)  -     Complete PFT - Pre & Post Bronchodilator; Future    2. Other allergic rhinitis  -     IgE; Future  -     Allergens, Zone 8; Future    3. Nocturnal hypoxia    4. Nicotine dependence, cigarettes, uncomplicated  -      CT Chest Low Dose Cancer Screening WO; Future    Other orders  -     azelastine (ASTELIN) 0.1 % nasal spray; 1 spray into the nostril(s) as directed by provider 2 (Two) Times a Day As Needed for Rhinitis or Allergies. Use in each nostril as directed  Dispense: 1 each; Refill: 5           Return in about 3 months (around 1/12/2024) for Recheck, Imaging, For [place] ([place]), ....Also 7-8 mths w/ Dr. Huang.    DISCUSSION (if any):  Last CT scan results was reviewed in great detail with the patient.  Results for orders placed during the hospital encounter of 09/27/22    CT Chest Low Dose Cancer Screening WO    Narrative  PROCEDURE: CT CHEST LOW DOSE CANCER SCREENING WO-    HISTORY: LUNG CANCER SCREENING; F17.200-Nicotine dependence,  unspecified, uncomplicated    COMPARISON: September 2021    TECHNIQUE: Axial images were obtained from the thoracic inlet through  the upper abdomen per the low-dose protocol. Coronal images were  reformatted. .89 mGy.cm; CTDI 3.28 mGy    FINDINGS:  There is mild lingular atelectasis and scarring. There is  stable left apical nodular scarring. No suspicious pulmonary nodule or  mass is identified. There is no airspace disease. The heart is normal in  size. The aorta is normal in caliber. There are no pleural or  pericardial effusions. The visualized upper abdomen is unremarkable.  Bone windows reveal no  acute osseous abnormalities.    Impression  No evidence of malignancy. Lung RADS category 1.    RECOMMENDATIONS:Annual low-dose chest CT.        Images were reviewed, interpreted, and dictated by Dr. Santhosh Jimenez MD  Transcribed by Cecilia Pena PA-C.    This report was signed and finalized on 9/27/2022 10:41 AM by Santhosh Jimenez MD.        Laboratory data was reviewed with her.   Lab Results   Component Value Date    AFPTM 151 01/04/2017     Lab Results   Component Value Date    A1MXAYGR MM 01/04/2017     Last available PFTs were reviewed.  Consistent with moderate obstruction. Performed in November 2021.    PFTs were performed today.  Moderate obstruction with FEV1 of 67% predicted.     We have reviewed her pulmonary medications in great detail.    Compliance with medications stressed.     Side effects of prescribed medications discussed with the patient    Patient was strongly encouraged to quit smoking as soon as possible.    The patient belongs to the risk group for which lung cancer screening has been recommended. We will try to make arrangements for the same and this will be indicated soon. This has been ordered    The patient was advised to continue using oxygen at night.    Due to symptoms suggestive of poorly controlled allergic rhinitis, she was prescribed additional nasal spray with the advise to use all the recommended/prescribed nasal sprays on a regular basis.    I have ordered IgE/RAST panel.      Dictated utilizing Dragon dictation.    This document was electronically signed by Bianca Huang MD on 10/12/23 at 16:24 EDT

## 2023-10-24 ENCOUNTER — TELEPHONE (OUTPATIENT)
Dept: PULMONOLOGY | Facility: CLINIC | Age: 58
End: 2023-10-24
Payer: COMMERCIAL

## 2023-10-24 NOTE — TELEPHONE ENCOUNTER
Caller: Sarita Horan    Relationship to patient: Self    Best call back number: [phone] (home)       Patient is needing: PLEASE GIVE PT A CALL AND EXPLAIN PFT RESULTS, SHE HAS VIEWED THEM ON MYCHART BUT NEEDS THEM EXPLAINED

## 2023-11-03 ENCOUNTER — HOSPITAL ENCOUNTER (OUTPATIENT)
Dept: CT IMAGING | Facility: HOSPITAL | Age: 58
Discharge: HOME | End: 2023-11-03
Payer: COMMERCIAL

## 2023-11-03 DIAGNOSIS — F17.210 NICOTINE DEPENDENCE, CIGARETTES, UNCOMPLICATED: ICD-10-CM

## 2023-11-03 PROCEDURE — 71271 CT THORAX LUNG CANCER SCR C-: CPT

## 2024-01-10 ENCOUNTER — LAB (OUTPATIENT)
Dept: LAB | Facility: HOSPITAL | Age: 59
End: 2024-01-10
Payer: COMMERCIAL

## 2024-01-10 DIAGNOSIS — J30.89 OTHER ALLERGIC RHINITIS: ICD-10-CM

## 2024-01-10 PROCEDURE — 86003 ALLG SPEC IGE CRUDE XTRC EA: CPT

## 2024-01-10 PROCEDURE — 82785 ASSAY OF IGE: CPT

## 2024-01-13 LAB
A ALTERNATA IGE QN: <0.1 KU/L
A FUMIGATUS IGE QN: <0.1 KU/L
AMER ROACH IGE QN: <0.1 KU/L
BAHIA GRASS IGE QN: <0.1 KU/L
BERMUDA GRASS IGE QN: <0.1 KU/L
BOXELDER IGE QN: <0.1 KU/L
C HERBARUM IGE QN: <0.1 KU/L
CAT DANDER IGE QN: <0.1 KU/L
CMN PIGWEED IGE QN: <0.1 KU/L
COMMON RAGWEED IGE QN: <0.1 KU/L
CONV CLASS DESCRIPTION: NORMAL
D FARINAE IGE QN: <0.1 KU/L
D PTERONYSS IGE QN: <0.1 KU/L
DOG DANDER IGE QN: <0.1 KU/L
ENGL PLANTAIN IGE QN: <0.1 KU/L
HAZELNUT POLN IGE QN: <0.1 KU/L
JOHNSON GRASS IGE QN: <0.1 KU/L
KENT BLUE GRASS IGE QN: <0.1 KU/L
LONDON PLANE IGE QN: <0.1 KU/L
M RACEMOSUS IGE QN: <0.1 KU/L
MT JUNIPER IGE QN: <0.1 KU/L
MUGWORT IGE QN: <0.1 KU/L
NETTLE IGE QN: <0.1 KU/L
P NOTATUM IGE QN: <0.1 KU/L
S BOTRYOSUM IGE QN: <0.1 KU/L
SHEEP SORREL IGE QN: <0.1 KU/L
SWEET GUM IGE QN: <0.1 KU/L
WHITE ELM IGE QN: <0.1 KU/L
WHITE HICKORY IGE QN: <0.1 KU/L
WHITE MULBERRY IGE QN: <0.1 KU/L
WHITE OAK IGE QN: <0.1 KU/L

## 2024-01-15 ENCOUNTER — OFFICE VISIT (OUTPATIENT)
Dept: PULMONOLOGY | Facility: CLINIC | Age: 59
End: 2024-01-15
Payer: COMMERCIAL

## 2024-01-15 VITALS
BODY MASS INDEX: 30.23 KG/M2 | HEIGHT: 60 IN | DIASTOLIC BLOOD PRESSURE: 82 MMHG | OXYGEN SATURATION: 95 % | RESPIRATION RATE: 18 BRPM | WEIGHT: 154 LBS | SYSTOLIC BLOOD PRESSURE: 130 MMHG | HEART RATE: 70 BPM

## 2024-01-15 DIAGNOSIS — J44.89 ASTHMA WITH COPD: Primary | ICD-10-CM

## 2024-01-15 DIAGNOSIS — J43.9 PULMONARY EMPHYSEMA, UNSPECIFIED EMPHYSEMA TYPE: ICD-10-CM

## 2024-01-15 DIAGNOSIS — Z87.891 PERSONAL HISTORY OF NICOTINE DEPENDENCE: ICD-10-CM

## 2024-01-15 RX ORDER — PREDNISONE 20 MG/1
40 TABLET ORAL DAILY
Qty: 10 TABLET | Refills: 0 | Status: SHIPPED | OUTPATIENT
Start: 2024-01-15

## 2024-01-15 NOTE — PROGRESS NOTES
Follow Up Office Visit      Patient Name: Sarita Horan    Chief Complaint:    Chief Complaint   Patient presents with    Breathing Problem    Follow-up       History of Present Illness: Sarita Horan is a 58 y.o. female who is here today for follow up of COPD.  Since last visit, she was treated for an exacerbation in urgent care in November.  Symptoms did improve thereafter.  However, she still requires short acting beta agonist use at least 3 times per day.  She reports a lot of wheezing at baseline.  + Chronic cough, which is mostly dry and occurs in the mornings. She does smoke and vape occasionally still.    Duration: Asthma diagnosed greater than 20 years ago  Supplemental Oxygen: 2L NC qhs  Last Steroids: November 2023  Number of exacerbations per year: Several    Subjective      Review of Systems:  Review of Systems   Constitutional:  Negative for fever and unexpected weight change.   Respiratory:  Positive for cough, shortness of breath and wheezing.    Cardiovascular:  Negative for chest pain and leg swelling.        Past Medical History:   Past Medical History:   Diagnosis Date    Anxiety and depression     Asthma     Back pain     Cervicalgia     Chronic bronchitis     COPD (chronic obstructive pulmonary disease)     Diabetes mellitus     GERD (gastroesophageal reflux disease)     Hyperlipidemia     Hypothyroidism     Migraine     Panic disorder     Pneumonia     Sleep apnea, obstructive     Thyroid disease 2010       Past Surgical History:   Past Surgical History:   Procedure Laterality Date    HYSTEROSCOPY ENDOMETRIAL ABLATION      LAPAROSCOPIC CHOLECYSTECTOMY      TUBAL ABDOMINAL LIGATION         Family History:   Family History   Problem Relation Age of Onset    Uterine cancer Mother     Stroke Father     Diabetes Father     Hypertension Father     Heart disease Father         Ischemic heart disease    COPD Father        Social History:   Social History     Socioeconomic History     Marital status:    Tobacco Use    Smoking status: Every Day     Packs/day: 1.00     Years: 34.00     Additional pack years: 0.00     Total pack years: 34.00     Types: Cigarettes     Last attempt to quit: 10/27/2021     Years since quittin.2    Smokeless tobacco: Never   Vaping Use    Vaping Use: Some days   Substance and Sexual Activity    Alcohol use: No    Drug use: No    Sexual activity: Not Currently     Partners: Male       Current Medications:     Current Outpatient Medications:     albuterol (PROVENTIL) (2.5 MG/3ML) 0.083% nebulizer solution, Albuterol Sulfate (2.5 MG/3ML) 0.083% Inhalation Nebulization Solution; Patient Sig: Albuterol Sulfate (2.5 MG/3ML) 0.083% Inhalation Nebulization Solution USE 1 UNIT DOSE EVERY 4-6 HOURS AS NEEDED FOR WHEEZING .; 0; -2014; Active, Disp: , Rfl:     azelastine (ASTELIN) 0.1 % nasal spray, 1 spray into the nostril(s) as directed by provider 2 (Two) Times a Day As Needed for Rhinitis or Allergies. Use in each nostril as directed, Disp: 1 each, Rfl: 5    benzonatate (TESSALON) 100 MG capsule, Take 1 capsule by mouth 3 (Three) Times a Day As Needed for Cough., Disp: 30 capsule, Rfl: 0    Blood Glucose Monitoring Suppl (ONE TOUCH ULTRA MINI) w/Device kit, , Disp: , Rfl:     gabapentin (NEURONTIN) 800 MG tablet, take 1 tablet by mouth three times a day, Disp: , Rfl: 0    HYDROcodone-acetaminophen (NORCO)  MG per tablet, Take 1 tablet by mouth Every 8 (Eight) Hours As Needed., Disp: , Rfl:     levothyroxine (SYNTHROID, LEVOTHROID) 150 MCG tablet, TAKE 1 TABLET BY MOUTH EVERY DAY IN THE MORNING ON AN EMPTY STOMACH, Disp: , Rfl:     lisinopril-hydrochlorothiazide (PRINZIDE,ZESTORETIC) 20-12.5 MG per tablet, Take 1 tablet by mouth Daily., Disp: , Rfl:     metFORMIN (GLUCOPHAGE) 500 MG tablet, , Disp: , Rfl: 0    montelukast (SINGULAIR) 10 MG tablet, Take 1 tablet by mouth Every Night., Disp: 90 tablet, Rfl: 2    omeprazole (priLOSEC) 40 MG capsule, Take 1  "capsule by mouth Daily., Disp: , Rfl: 0    ONETOUCH DELICA LANCETS 33G misc, USE AS DIRECTED TO TEST TWICE DAILY, Disp: , Rfl: 0    ONETOUCH VERIO test strip, , Disp: , Rfl: 1    ProAir  (90 Base) MCG/ACT inhaler, Inhale 2 puffs Every 6 (Six) Hours As Needed for Wheezing., Disp: 8 g, Rfl: 3    RA ACETAMINOPHEN EX  MG tablet, , Disp: , Rfl: 0    simvastatin (ZOCOR) 20 MG tablet, Take 1 tablet by mouth Every Night., Disp: , Rfl: 0    SUMAtriptan (IMITREX) 25 MG tablet, take 1 tablet by mouth twice a day if needed, Disp: , Rfl: 0    Trelegy Ellipta 100-62.5-25 MCG/INH inhaler, Inhale 1 puff Daily. Rinse mouth with water after use., Disp: 3 each, Rfl: 2     Allergies:   Allergies   Allergen Reactions    Ibuprofen Itching, Other (See Comments) and GI Intolerance     Chest pain      Rocephin [Ceftriaxone] Itching and Rash       Objective     Physical Exam:  Vital Signs:   Vitals:    01/15/24 1424   BP: 130/82   Pulse: 70   Resp: 18   SpO2: 95%   Weight: 69.9 kg (154 lb)   Height: 152.4 cm (60\")     Body mass index is 30.08 kg/m².    Physical Exam  Vitals reviewed.   Constitutional:       General: She is not in acute distress.     Appearance: She is not toxic-appearing.   HENT:      Head: Normocephalic and atraumatic.      Mouth/Throat:      Mouth: Mucous membranes are moist.   Eyes:      Conjunctiva/sclera: Conjunctivae normal.   Cardiovascular:      Rate and Rhythm: Normal rate.      Heart sounds: Normal heart sounds.   Pulmonary:      Effort: Pulmonary effort is normal.      Breath sounds: Normal breath sounds.   Abdominal:      General: There is no distension.      Palpations: Abdomen is soft.   Musculoskeletal:         General: No swelling.      Cervical back: Neck supple.   Skin:     General: Skin is warm and dry.      Findings: No rash.   Neurological:      General: No focal deficit present.      Mental Status: She is alert and oriented to person, place, and time.   Psychiatric:         Mood and Affect: " Mood normal.         Behavior: Behavior normal.       Results Review:   November 2023 LDCT chest showed mild emphysema, no evidence of lung malignancy. Lingular scarring with traction bronchiectasis is stable.     October 2023 PFT showed moderate obstruction without bronchodilator response.  Postbronchodilator FEV1 62%.  No restriction, + air trapping.  DLCO/%.    WBC   Date Value Ref Range Status   05/31/2021 12.69 (H) 3.40 - 10.80 10*3/mm3 Final     RBC   Date Value Ref Range Status   05/31/2021 4.08 3.77 - 5.28 10*6/mm3 Final     Hemoglobin   Date Value Ref Range Status   05/31/2021 13.2 12.0 - 15.9 g/dL Final     Hematocrit   Date Value Ref Range Status   05/31/2021 39.9 34.0 - 46.6 % Final     MCV   Date Value Ref Range Status   05/31/2021 97.8 (H) 79.0 - 97.0 fL Final     MCH   Date Value Ref Range Status   05/31/2021 32.4 26.6 - 33.0 pg Final     MCHC   Date Value Ref Range Status   05/31/2021 33.1 31.5 - 35.7 g/dL Final     RDW   Date Value Ref Range Status   05/31/2021 13.1 12.3 - 15.4 % Final     RDW-SD   Date Value Ref Range Status   05/31/2021 47.2 37.0 - 54.0 fl Final     MPV   Date Value Ref Range Status   05/31/2021 9.2 6.0 - 12.0 fL Final     Platelets   Date Value Ref Range Status   05/31/2021 298 140 - 450 10*3/mm3 Final     Neutrophil %   Date Value Ref Range Status   05/31/2021 47.9 42.7 - 76.0 % Final     Lymphocyte %   Date Value Ref Range Status   05/31/2021 46.3 (H) 19.6 - 45.3 % Final     Monocyte %   Date Value Ref Range Status   05/31/2021 4.0 (L) 5.0 - 12.0 % Final     Eosinophil %   Date Value Ref Range Status   05/31/2021 1.1 0.3 - 6.2 % Final     Basophil %   Date Value Ref Range Status   05/31/2021 0.5 0.0 - 1.5 % Final     Immature Grans %   Date Value Ref Range Status   05/31/2021 0.2 0.0 - 0.5 % Final     Neutrophils, Absolute   Date Value Ref Range Status   05/31/2021 6.07 1.70 - 7.00 10*3/mm3 Final     Lymphocytes, Absolute   Date Value Ref Range Status   05/31/2021 5.88  (H) 0.70 - 3.10 10*3/mm3 Final     Monocytes, Absolute   Date Value Ref Range Status   05/31/2021 0.51 0.10 - 0.90 10*3/mm3 Final     Eosinophils, Absolute   Date Value Ref Range Status   05/31/2021 0.14 0.00 - 0.40 10*3/mm3 Final     Basophils, Absolute   Date Value Ref Range Status   05/31/2021 0.06 0.00 - 0.20 10*3/mm3 Final     Immature Grans, Absolute   Date Value Ref Range Status   05/31/2021 0.03 0.00 - 0.05 10*3/mm3 Final     nRBC   Date Value Ref Range Status   05/31/2021 0.0 0.0 - 0.2 /100 WBC Final     Assessment / Plan      Assessment/Plan:   Diagnoses and all orders for this visit:    1. Asthma with COPD (Primary)  -     Fluticasone-Umeclidin-Vilant 200-62.5-25 MCG/ACT aerosol powder ; Inhale 1 puff Daily for 90 days. Rinse mouth out after use  Dispense: 3 each; Refill: 3  -     predniSONE (DELTASONE) 20 MG tablet; Take 2 tablets by mouth Daily.  Dispense: 10 tablet; Refill: 0  -     ProAir  (90 Base) MCG/ACT inhaler; Inhale 2 puffs Every 4 (Four) Hours As Needed for Wheezing.  Dispense: 18 g; Refill: 5  Patient with uncontrolled symptoms and frequent exacerbations.  Transition to use of high-dose Trelegy.  Recent RAST panel negative, IgE level pending.  If patient continues to exacerbate despite high-dose ICS use, will need to consider use of an asthma biologic, in which case will need to get an updated CBC with differential to assess eosinophil count. We discussed the risk and benefits of inhaled corticosteroids. Patient instructed to take them on a regular basis as prescribed. Patient instructed to rinse their mouth out after each use.  Will send in a course of prednisone for patient to have on hand in the event that she exacerbates again. Discussed possible side effects of medications. Risk and benefits of the medication were discussed with patient.     2. Pulmonary emphysema, unspecified emphysema type  Normal alpha-1 antitrypsin phenotype.    3. Personal history of nicotine  dependence  Recent LDCT chest results reviewed and discussed with patient.  Recommend continuing annual screening scans.  Complete smoking/vaping cessation is advised.    Follow Up:   May 2024 as previously scheduled  The patient was counseled on diagnostic results, risks and benefits of treatment options, risk factor modifications and the importance of treatment compliance. The patient was advised to contact the clinic with concerns or worsening symptoms.     LEX Machado   Pulmonary Medicine [place]

## 2024-02-28 ENCOUNTER — HOSPITAL ENCOUNTER (OUTPATIENT)
Dept: GENERAL RADIOLOGY | Facility: HOSPITAL | Age: 59
Discharge: HOME | End: 2024-02-28
Payer: COMMERCIAL

## 2024-02-28 ENCOUNTER — TRANSCRIBE ORDERS (OUTPATIENT)
Dept: GENERAL RADIOLOGY | Facility: HOSPITAL | Age: 59
End: 2024-02-28
Payer: COMMERCIAL

## 2024-02-28 DIAGNOSIS — M25.512 BILATERAL SHOULDER PAIN, UNSPECIFIED CHRONICITY: Primary | ICD-10-CM

## 2024-02-28 DIAGNOSIS — M25.511 BILATERAL SHOULDER PAIN, UNSPECIFIED CHRONICITY: ICD-10-CM

## 2024-02-28 DIAGNOSIS — M25.511 BILATERAL SHOULDER PAIN, UNSPECIFIED CHRONICITY: Primary | ICD-10-CM

## 2024-02-28 DIAGNOSIS — M25.512 BILATERAL SHOULDER PAIN, UNSPECIFIED CHRONICITY: ICD-10-CM

## 2024-02-28 PROCEDURE — 73030 X-RAY EXAM OF SHOULDER: CPT

## 2024-05-20 ENCOUNTER — TRANSCRIBE ORDERS (OUTPATIENT)
Dept: ADMINISTRATIVE | Facility: HOSPITAL | Age: 59
End: 2024-05-20
Payer: COMMERCIAL

## 2024-05-20 DIAGNOSIS — N63.0 BREAST NODULE: Primary | ICD-10-CM

## 2024-05-23 ENCOUNTER — OFFICE VISIT (OUTPATIENT)
Dept: PULMONOLOGY | Facility: CLINIC | Age: 59
End: 2024-05-23
Payer: COMMERCIAL

## 2024-05-23 VITALS
HEIGHT: 60 IN | WEIGHT: 151.2 LBS | SYSTOLIC BLOOD PRESSURE: 120 MMHG | HEART RATE: 66 BPM | RESPIRATION RATE: 18 BRPM | OXYGEN SATURATION: 95 % | BODY MASS INDEX: 29.68 KG/M2 | DIASTOLIC BLOOD PRESSURE: 68 MMHG

## 2024-05-23 DIAGNOSIS — Z87.891 PERSONAL HISTORY OF NICOTINE DEPENDENCE: ICD-10-CM

## 2024-05-23 DIAGNOSIS — J30.89 OTHER ALLERGIC RHINITIS: ICD-10-CM

## 2024-05-23 DIAGNOSIS — J44.89 ASTHMA WITH COPD: Primary | ICD-10-CM

## 2024-05-23 DIAGNOSIS — J43.9 PULMONARY EMPHYSEMA, UNSPECIFIED EMPHYSEMA TYPE: ICD-10-CM

## 2024-05-23 PROCEDURE — 99214 OFFICE O/P EST MOD 30 MIN: CPT | Performed by: INTERNAL MEDICINE

## 2024-05-23 NOTE — PROGRESS NOTES
"  Chief Complaint   Patient presents with    Shortness of Breath    Follow-up         Subjective   Sarita Horan is a 59 y.o. female.   Patient was evaluated today for follow up of shortness of breath, allergic rhinitis and COPD.     Patient says that her symptoms have been stable since the last clinic visit. she reports no recent exacerbations.      Patient is using Trelegy, as prescribed. Exercise tolerance has also remained stable.     Quit smoking in late 2021 ago.     Patient says that she has been using her nasal sprays on a seasonal basis and describes no significant ongoing issues other than occasional congestion.     The following portions of the patient's history were reviewed and updated as appropriate: allergies, current medications, past family history, past medical history, past social history, and past surgical history.    Review of Systems   HENT:  Negative for sinus pressure, sneezing and sore throat.    Respiratory:  Positive for cough and wheezing. Negative for chest tightness and shortness of breath.    Psychiatric/Behavioral:  Negative for sleep disturbance.        Objective   Visit Vitals  /68   Pulse 66   Resp 18   Ht 152.4 cm (60\") Comment: pt reported   Wt 68.6 kg (151 lb 3.2 oz)   SpO2 95%   BMI 29.53 kg/m²         BMI Readings from Last 3 Encounters:   05/23/24 29.53 kg/m²   05/08/24 29.49 kg/m²   04/06/24 30.10 kg/m²       Physical Exam  Vitals reviewed.   Constitutional:       Appearance: She is well-developed.   HENT:      Head: Normocephalic and atraumatic.   Eyes:      Extraocular Movements: Extraocular movements intact.   Cardiovascular:      Rate and Rhythm: Normal rate.   Pulmonary:      Comments: Somewhat hyperresonant to percussion.  Somewhat decreased air entry.  No obvious wheezing noted.   Musculoskeletal:      Cervical back: Neck supple.   Neurological:      Mental Status: She is alert.             Assessment & Plan   Diagnoses and all orders for this visit:    1. " Asthma with COPD (Primary)    2. Other allergic rhinitis    3. Pulmonary emphysema, unspecified emphysema type    4. Personal history of nicotine dependence  -      CT Chest Low Dose Cancer Screening WO; Future    Other orders  -     Fluticasone-Umeclidin-Vilant (TRELEGY ELLIPTA) 200-62.5-25 MCG/ACT inhaler; Inhale 1 puff Daily. Rinse mouth with water after use.  Dispense: 60 each; Refill: 8           Return in about 29 weeks (around 12/12/2024) for Recheck, Imaging, For [facility] ([place]).    DISCUSSION (if any):  Last CT scan results was reviewed in great detail with the patient.  Results for orders placed during the hospital encounter of 11/03/23    CT Chest Low Dose Cancer Screening WO    Narrative  PROCEDURE: CT CHEST LOW DOSE CANCER SCREENING WO-    History: Lung cancer screening, greater than or equal to 30 pack year  smoking history in the last 15 years.    TECHNIQUE: Axial images were obtained from the thoracic inlet through  the upper abdomen per low dose protocol. Multiplanar reconstruction was  submitted for review.    COMPARISON: September 13, 2021.    FINDINGS:  Mild emphysema. No consolidation. No pleural effusion. No pneumothorax.  Lingular scarring with traction bronchiectasis is stable.    No suspicious pulmonary nodule or mass. Small sliding-type hiatal  hernia. No acute bony abnormality.    Impression  No evidence of lung malignancy.    Lung RADS category 1: Follow-up low-dose CT chest in 12 months..        This report was signed and finalized on 11/4/2023 8:37 AM by Santhosh Jimenez MD.    Laboratory data was reviewed with her.   Lab Results   Component Value Date    AFPTM 151 01/04/2017     Lab Results   Component Value Date    [id number] MM 01/04/2017     Latest available PFTs were reviewed.  Consistent with moderate obstruction    We have reviewed her pulmonary medications in great detail.    Compliance with medications stressed.     Side effects of prescribed medications discussed with the  patient    The patient belongs to the risk group for which lung cancer screening has been recommended.  This will be indicated in Nov 2024. This has been ordered     Patient was advised to continue her nasal spray, especially given improvement in symptoms overall.    Vaccination status addressed.    Up-to-date with influenza vaccinations.     Declines pneumonia vaccinations.     It was recommended that the patient consider Prevnar-20 vaccination and discuss it with her PCP, if not already obtained.     For the vaccines, that she refused today, I have asked her to discuss this further with her PCP.      Dictated utilizing Dragon dictation.    This document was electronically signed by Bianca Huang MD on 05/23/24 at 13:38 EDT

## 2024-06-06 ENCOUNTER — HOSPITAL ENCOUNTER (OUTPATIENT)
Dept: ULTRASOUND IMAGING | Facility: HOSPITAL | Age: 59
Discharge: HOME | End: 2024-06-06
Payer: COMMERCIAL

## 2024-06-06 ENCOUNTER — HOSPITAL ENCOUNTER (OUTPATIENT)
Dept: MAMMOGRAPHY | Facility: HOSPITAL | Age: 59
Discharge: HOME | End: 2024-06-06
Payer: COMMERCIAL

## 2024-06-06 DIAGNOSIS — N63.0 BREAST NODULE: ICD-10-CM

## 2024-06-06 PROCEDURE — G0279 TOMOSYNTHESIS, MAMMO: HCPCS

## 2024-06-06 PROCEDURE — 76642 ULTRASOUND BREAST LIMITED: CPT

## 2024-06-06 PROCEDURE — 77066 DX MAMMO INCL CAD BI: CPT

## 2024-08-04 PROBLEM — R05.9 COUGH: Status: ACTIVE | Noted: 2024-08-04

## 2024-08-16 ENCOUNTER — HOSPITAL ENCOUNTER (OUTPATIENT)
Dept: GENERAL RADIOLOGY | Facility: HOSPITAL | Age: 59
Discharge: HOME | End: 2024-08-16
Payer: COMMERCIAL

## 2024-08-16 ENCOUNTER — TRANSCRIBE ORDERS (OUTPATIENT)
Dept: GENERAL RADIOLOGY | Facility: HOSPITAL | Age: 59
End: 2024-08-16
Payer: COMMERCIAL

## 2024-08-16 DIAGNOSIS — M79.671 RIGHT FOOT PAIN: ICD-10-CM

## 2024-08-16 DIAGNOSIS — M79.671 RIGHT FOOT PAIN: Primary | ICD-10-CM

## 2024-08-16 PROCEDURE — 73110 X-RAY EXAM OF WRIST: CPT

## 2024-08-16 PROCEDURE — 73630 X-RAY EXAM OF FOOT: CPT

## 2024-08-28 DIAGNOSIS — J44.89 ASTHMA WITH COPD: ICD-10-CM

## 2024-08-28 RX ORDER — ALBUTEROL SULFATE 90 UG/1
2 AEROSOL, METERED RESPIRATORY (INHALATION) EVERY 4 HOURS PRN
Qty: 18 G | Refills: 5 | Status: SHIPPED | OUTPATIENT
Start: 2024-08-28

## 2024-11-13 ENCOUNTER — HOSPITAL ENCOUNTER (OUTPATIENT)
Dept: CT IMAGING | Facility: HOSPITAL | Age: 59
Discharge: HOME | End: 2024-11-13
Admitting: INTERNAL MEDICINE
Payer: COMMERCIAL

## 2024-11-13 DIAGNOSIS — Z87.891 PERSONAL HISTORY OF NICOTINE DEPENDENCE: ICD-10-CM

## 2024-11-13 PROCEDURE — 71271 CT THORAX LUNG CANCER SCR C-: CPT

## 2025-01-27 ENCOUNTER — TELEPHONE (OUTPATIENT)
Dept: URGENT CARE | Facility: CLINIC | Age: 60
End: 2025-01-27
Payer: COMMERCIAL

## 2025-01-27 DIAGNOSIS — J44.1 COPD EXACERBATION: Primary | ICD-10-CM

## 2025-01-27 RX ORDER — DOXYCYCLINE 100 MG/1
CAPSULE ORAL
Qty: 20 CAPSULE | Refills: 0 | Status: SHIPPED | OUTPATIENT
Start: 2025-01-27

## 2025-03-28 ENCOUNTER — OFFICE VISIT (OUTPATIENT)
Dept: PULMONOLOGY | Facility: CLINIC | Age: 60
End: 2025-03-28
Payer: COMMERCIAL

## 2025-03-28 ENCOUNTER — LAB (OUTPATIENT)
Dept: LAB | Facility: HOSPITAL | Age: 60
End: 2025-03-28
Payer: COMMERCIAL

## 2025-03-28 VITALS
WEIGHT: 147 LBS | HEART RATE: 66 BPM | BODY MASS INDEX: 28.86 KG/M2 | OXYGEN SATURATION: 95 % | DIASTOLIC BLOOD PRESSURE: 62 MMHG | SYSTOLIC BLOOD PRESSURE: 110 MMHG | HEIGHT: 60 IN

## 2025-03-28 DIAGNOSIS — J44.89 COPD WITH ASTHMA: ICD-10-CM

## 2025-03-28 DIAGNOSIS — R06.02 SHORTNESS OF BREATH: Primary | ICD-10-CM

## 2025-03-28 DIAGNOSIS — R06.02 SHORTNESS OF BREATH: ICD-10-CM

## 2025-03-28 DIAGNOSIS — J30.9 ALLERGIC RHINITIS, UNSPECIFIED SEASONALITY, UNSPECIFIED TRIGGER: ICD-10-CM

## 2025-03-28 DIAGNOSIS — Z87.891 PERSONAL HISTORY OF NICOTINE DEPENDENCE: ICD-10-CM

## 2025-03-28 LAB
BASOPHILS # BLD AUTO: 0.06 10*3/MM3 (ref 0–0.2)
BASOPHILS NFR BLD AUTO: 0.7 % (ref 0–1.5)
DEPRECATED RDW RBC AUTO: 43.9 FL (ref 37–54)
EOSINOPHIL # BLD AUTO: 0.15 10*3/MM3 (ref 0–0.4)
EOSINOPHIL NFR BLD AUTO: 1.9 % (ref 0.3–6.2)
ERYTHROCYTE [DISTWIDTH] IN BLOOD BY AUTOMATED COUNT: 12.3 % (ref 12.3–15.4)
HCT VFR BLD AUTO: 40 % (ref 34–46.6)
HGB BLD-MCNC: 13.6 G/DL (ref 12–15.9)
IMM GRANULOCYTES # BLD AUTO: 0.02 10*3/MM3 (ref 0–0.05)
IMM GRANULOCYTES NFR BLD AUTO: 0.2 % (ref 0–0.5)
LYMPHOCYTES # BLD AUTO: 3.6 10*3/MM3 (ref 0.7–3.1)
LYMPHOCYTES NFR BLD AUTO: 44.6 % (ref 19.6–45.3)
MCH RBC QN AUTO: 33.2 PG (ref 26.6–33)
MCHC RBC AUTO-ENTMCNC: 34 G/DL (ref 31.5–35.7)
MCV RBC AUTO: 97.6 FL (ref 79–97)
MONOCYTES # BLD AUTO: 0.39 10*3/MM3 (ref 0.1–0.9)
MONOCYTES NFR BLD AUTO: 4.8 % (ref 5–12)
NEUTROPHILS NFR BLD AUTO: 3.86 10*3/MM3 (ref 1.7–7)
NEUTROPHILS NFR BLD AUTO: 47.8 % (ref 42.7–76)
NRBC BLD AUTO-RTO: 0 /100 WBC (ref 0–0.2)
PLATELET # BLD AUTO: 308 10*3/MM3 (ref 140–450)
PMV BLD AUTO: 9.6 FL (ref 6–12)
RBC # BLD AUTO: 4.1 10*6/MM3 (ref 3.77–5.28)
WBC NRBC COR # BLD AUTO: 8.08 10*3/MM3 (ref 3.4–10.8)

## 2025-03-28 PROCEDURE — 85025 COMPLETE CBC W/AUTO DIFF WBC: CPT

## 2025-03-28 PROCEDURE — 36415 COLL VENOUS BLD VENIPUNCTURE: CPT

## 2025-03-28 PROCEDURE — 99214 OFFICE O/P EST MOD 30 MIN: CPT | Performed by: NURSE PRACTITIONER

## 2025-03-28 RX ORDER — MONTELUKAST SODIUM 10 MG/1
10 TABLET ORAL NIGHTLY
Qty: 90 TABLET | Refills: 1 | Status: SHIPPED | OUTPATIENT
Start: 2025-03-28

## 2025-03-28 RX ORDER — ALBUTEROL SULFATE 90 UG/1
2 AEROSOL, METERED RESPIRATORY (INHALATION) EVERY 4 HOURS PRN
Qty: 18 G | Refills: 3 | Status: SHIPPED | OUTPATIENT
Start: 2025-03-28

## 2025-03-28 NOTE — PROGRESS NOTES
"     Follow Up Office Visit      Patient Name: Sarita Horan    Chief Complaint:    Chief Complaint   Patient presents with    Breathing Problem       History of Present Illness: Sarita Horan is a 60 y.o. female who is here today for follow up of asthma with COPD.  Since last visit, she has had multiple exacerbations which have been treated at urgent care w/ abx & steroids, most recently in January 2025.  She notes that her symptoms have since returned to baseline.  She works in housekeeping.  She reports compliance with Trelegy though still requires short acting beta agonist use ~3x/day.  She continues to vape.  Previously smoked 1 pack/day for 42 years.    Timing: Daily  Associated Symptoms: Exertional dyspnea, chronic cough intermittently productive of brown sputum, \"always\" wheezes, no fevers, no hemoptysis, no unintended weight loss  Modifying Factors: Dyspnea is worse with exertion and exposure to noxious odors, improves with rest/inhalers/steroids.  Supplemental Oxygen: 2L qhs  Last Steroids: January 2025  Number of exacerbations per year: 5    Subjective      Review of Systems:  Review of Systems   Constitutional:  Negative for fever and unexpected weight change.   Respiratory:  Positive for cough, shortness of breath and wheezing.    Cardiovascular:  Negative for chest pain.        Past Medical History:   Past Medical History:   Diagnosis Date    Anxiety and depression     Asthma     Back pain     Cervicalgia     Chronic bronchitis     COPD (chronic obstructive pulmonary disease)     Diabetes mellitus     GERD (gastroesophageal reflux disease)     Hyperlipidemia     Hypothyroidism     Migraine     Panic disorder     Pneumonia     Sleep apnea, obstructive     Thyroid disease 2010       Past Surgical History:   Past Surgical History:   Procedure Laterality Date    HYSTEROSCOPY ENDOMETRIAL ABLATION      LAPAROSCOPIC CHOLECYSTECTOMY      TUBAL ABDOMINAL LIGATION         Family History:   Family " History   Problem Relation Age of Onset    Uterine cancer Mother     Stroke Father     Diabetes Father     Hypertension Father     Heart disease Father         Ischemic heart disease    COPD Father     Breast cancer Neg Hx        Social History:   Social History     Socioeconomic History    Marital status:    Tobacco Use    Passive exposure: Past    Smokeless tobacco: Never   Vaping Use    Vaping status: Some Days    Substances: zero nicotine    Devices: Disposable   Substance and Sexual Activity    Alcohol use: No    Drug use: No    Sexual activity: Not Currently     Partners: Male       Current Medications:     Current Outpatient Medications:     azelastine (ASTELIN) 0.1 % nasal spray, 1 spray into the nostril(s) as directed by provider 2 (Two) Times a Day As Needed for Rhinitis or Allergies. Use in each nostril as directed, Disp: 1 each, Rfl: 5    Blood Glucose Monitoring Suppl (ONE TOUCH ULTRA MINI) w/Device kit, , Disp: , Rfl:     Fluticasone-Umeclidin-Vilant (TRELEGY ELLIPTA) 200-62.5-25 MCG/ACT inhaler, Inhale 1 puff Daily. Rinse mouth with water after use., Disp: 60 each, Rfl: 8    gabapentin (NEURONTIN) 800 MG tablet, take 1 tablet by mouth three times a day, Disp: , Rfl: 0    HYDROcodone-acetaminophen (NORCO)  MG per tablet, Take 1 tablet by mouth Every 8 (Eight) Hours As Needed., Disp: , Rfl:     ipratropium-albuterol (DUO-NEB) 0.5-2.5 mg/3 ml nebulizer, Nebulize q 4 h prn wheezing / shortness of breath, Disp: 360 mL, Rfl: 0    levothyroxine (SYNTHROID, LEVOTHROID) 150 MCG tablet, TAKE 1 TABLET BY MOUTH EVERY DAY IN THE MORNING ON AN EMPTY STOMACH, Disp: , Rfl:     lisinopril-hydrochlorothiazide (PRINZIDE,ZESTORETIC) 20-12.5 MG per tablet, Take 1 tablet by mouth Daily., Disp: , Rfl:     metFORMIN (GLUCOPHAGE) 500 MG tablet, , Disp: , Rfl: 0    montelukast (SINGULAIR) 10 MG tablet, Take 1 tablet by mouth Every Night., Disp: 90 tablet, Rfl: 2    omeprazole (priLOSEC) 40 MG capsule, Take 1  "capsule by mouth Daily., Disp: , Rfl: 0    ONETOUCH DELICA LANCETS 33G misc, USE AS DIRECTED TO TEST TWICE DAILY, Disp: , Rfl: 0    ONETOUCH VERIO test strip, , Disp: , Rfl: 1    simvastatin (ZOCOR) 20 MG tablet, Take 1 tablet by mouth Every Night., Disp: , Rfl: 0    Ventolin  (90 Base) MCG/ACT inhaler, INHALE TWO PUFFS BY MOUTH EVERY 4 HOURS AS NEEDED FOR WHEEZING, Disp: 18 g, Rfl: 5    ondansetron ODT (ZOFRAN-ODT) 4 MG disintegrating tablet, Take 1 tablet by mouth Every 6 (Six) Hours As Needed for Nausea. (Patient not taking: Reported on 3/28/2025), Disp: 15 tablet, Rfl: 0     Allergies:   Allergies   Allergen Reactions    Ibuprofen Itching, Other (See Comments) and GI Intolerance     Chest pain      Zithromax [Azithromycin] GI Intolerance     Pt requested not to get anymore    Rocephin [Ceftriaxone] Itching and Rash     Objective     Physical Exam:  Vital Signs:   Vitals:    03/28/25 0926   BP: 110/62   Pulse: 66   SpO2: 95%   Weight: 66.7 kg (147 lb)   Height: 152.4 cm (60\")     Body mass index is 28.71 kg/m².    Physical Exam  Vitals reviewed.   Constitutional:       General: She is not in acute distress.     Appearance: She is not toxic-appearing.   HENT:      Head: Normocephalic and atraumatic.      Mouth/Throat:      Mouth: Mucous membranes are moist.   Eyes:      Extraocular Movements: Extraocular movements intact.      Conjunctiva/sclera: Conjunctivae normal.   Cardiovascular:      Rate and Rhythm: Normal rate.      Heart sounds: Normal heart sounds.   Pulmonary:      Effort: Pulmonary effort is normal.      Breath sounds: Normal breath sounds.   Abdominal:      General: There is no distension.      Palpations: Abdomen is soft.   Musculoskeletal:         General: No swelling.      Cervical back: Neck supple.   Skin:     General: Skin is warm and dry.      Findings: No rash.   Neurological:      General: No focal deficit present.      Mental Status: She is alert and oriented to person, place, and " time.   Psychiatric:         Mood and Affect: Mood normal.         Behavior: Behavior normal.       Results Review:   November 2024 LDCT chest showed no suspicious pulmonary nodule or mass.    Assessment / Plan      Assessment/Plan:   Diagnoses and all orders for this visit:    1. Shortness of breath (Primary)  -     CBC & Differential; Future  Now back at baseline following most recent exacerbation.  Still requires use of albuterol multiple times per day.    2. COPD with asthma  -     CBC & Differential; Future  -     Fluticasone-Umeclidin-Vilant (TRELEGY ELLIPTA) 200-62.5-25 MCG/ACT inhaler; Inhale 1 puff Daily. Rinse mouth with water after use.  Dispense: 60 each; Refill: 5  -     Ventolin  (90 Base) MCG/ACT inhaler; Inhale 2 puffs Every 4 (Four) Hours As Needed for Wheezing or Shortness of Air.  Dispense: 18 g; Refill: 3  -     Ambulatory Referral to Disease State Management  Patient with frequent exacerbations despite high-dose ICS use.  Check updated CBC and if eosinophils are 150 or greater, will refer to the [facility] clinic to be initiated on an asthma biologic.  Continue current inhaled regimen. We discussed the risk and benefits of inhaled corticosteroids. Patient instructed to take them on a regular basis as prescribed. Patient instructed to rinse their mouth out after each use.     3. Allergic rhinitis, unspecified seasonality, unspecified trigger  -     montelukast (SINGULAIR) 10 MG tablet; Take 1 tablet by mouth Every Night.  Dispense: 90 tablet; Refill: 1  Continues on Singulair and azelastine nasal spray.    4. Personal history of nicotine dependence  Complete/ongoing cessation of cigarette smoking and vaping is advised.  Recent LDCT chest results reviewed and discussed with patient.  Recommend continue with annual lung cancer screening scans.       Follow Up:   Return in about 6 months (around 9/28/2025) for Recheck.  The patient was counseled on diagnostic results, risks and benefits of treatment  options, risk factor modifications and the importance of treatment compliance. The patient was advised to contact the clinic with concerns or worsening symptoms.     LEX Machado   Pulmonary Medicine [place]     This document has been electronically signed by LEX Machado  March 28, 2025

## 2025-03-31 ENCOUNTER — TELEPHONE (OUTPATIENT)
Dept: PULMONOLOGY | Facility: CLINIC | Age: 60
End: 2025-03-31
Payer: COMMERCIAL

## 2025-03-31 NOTE — TELEPHONE ENCOUNTER
Made patient aware of result.   Per LEX Pang   No acute/new/worrisome/concerning findings were noted on the lab work. Will refer to [facility] clinic for biologic as discussed in clinic. She can discuss any other cbc concerns with her pcp if she desires.     Patient verbalized understanding

## 2025-03-31 NOTE — TELEPHONE ENCOUNTER
Caller: MARILEE ORTIZ     Relationship to patient: SELF    Best call back number: [phone]    Patient is needing: PT RECEIVED LAB RESULTS VIA [facility] ON SATURDAY, AND HAS BEEN A MESS EVER SINCE. WOULD LIKE TO REVIEW LAB RESULTS WITH SOMEONE ASAP.

## 2025-04-04 DIAGNOSIS — J44.89 COPD WITH ASTHMA: ICD-10-CM

## 2025-04-04 RX ORDER — FLUTICASONE FUROATE, UMECLIDINIUM BROMIDE AND VILANTEROL TRIFENATATE 200; 62.5; 25 UG/1; UG/1; UG/1
POWDER RESPIRATORY (INHALATION)
Qty: 60 EACH | Refills: 8 | Status: SHIPPED | OUTPATIENT
Start: 2025-04-04

## 2025-05-21 ENCOUNTER — OFFICE VISIT (OUTPATIENT)
Dept: CARDIOLOGY | Facility: CLINIC | Age: 60
End: 2025-05-21
Payer: COMMERCIAL

## 2025-05-21 VITALS
RESPIRATION RATE: 20 BRPM | SYSTOLIC BLOOD PRESSURE: 102 MMHG | HEART RATE: 69 BPM | WEIGHT: 145.4 LBS | OXYGEN SATURATION: 93 % | DIASTOLIC BLOOD PRESSURE: 70 MMHG | HEIGHT: 60 IN | BODY MASS INDEX: 28.54 KG/M2

## 2025-05-21 DIAGNOSIS — R55 SYNCOPE AND COLLAPSE: Primary | ICD-10-CM

## 2025-05-21 RX ORDER — CETIRIZINE HYDROCHLORIDE 10 MG/1
1 TABLET ORAL DAILY
COMMUNITY
Start: 2025-05-03

## 2025-05-21 NOTE — PROGRESS NOTES
"     [facility] Cardiology OP Consult Note    Sarita Horan  [id number]  2025    Referred By: Dara Anne, *    Chief Complaint: Syncope    History of Present Illness:   Mrs. Sarita Horan is a 60 y.o. female who presents to the Cardiology Clinic for evaluation of recurrent syncopal episodes.  The patient has a past medical history significant for COPD, type 2 diabetes mellitus, hyperlipidemia, and hypothyroidism.  She denies any significant past cardiac history.  The patient reports a long history of intermittent syncopal episodes.  She reports the episodes began with increased shortness of breath.  As her shortness of breath progresses, she will occasionally \"blackout.\"  She had 1 episode several weeks ago, with an additional episode approximately 9 months prior.  She reports that if she is able to use her inhaler, the episodes of shortness of breath and and subsequent syncope are aborted.  She denies any associated chest pain or chest discomfort.  No palpitations.  No other specific complaints today.    Past Cardiac Testin. Last Coronary Angio: None  2. Prior Stress Testing: None  3. Last Echo: None  4. Prior Holter Monitor: None    Review of Systems:   Review of Systems   Constitutional:  Negative for activity change, appetite change, chills, diaphoresis, fatigue, fever, unexpected weight gain and unexpected weight loss.   Eyes:  Negative for blurred vision and double vision.   Respiratory:  Positive for shortness of breath. Negative for cough, chest tightness and wheezing.    Cardiovascular:  Negative for chest pain, palpitations and leg swelling.   Gastrointestinal:  Negative for abdominal pain, anal bleeding, blood in stool and GERD.   Endocrine: Negative for cold intolerance and heat intolerance.   Genitourinary:  Negative for hematuria.   Neurological:  Positive for syncope. Negative for dizziness, weakness and light-headedness.   Hematological:  Does not " bruise/bleed easily.   Psychiatric/Behavioral:  Negative for depressed mood and stress. The patient is not nervous/anxious.        Past Medical History:   Past Medical History:   Diagnosis Date    Anxiety and depression     Asthma     Back pain     Cervicalgia     Chronic bronchitis     COPD (chronic obstructive pulmonary disease)     Diabetes mellitus     GERD (gastroesophageal reflux disease)     Hyperlipidemia     Hypothyroidism     Migraine     Panic disorder     Pneumonia     Sleep apnea, obstructive     Thyroid disease 2010       Past Surgical History:   Past Surgical History:   Procedure Laterality Date    HYSTEROSCOPY ENDOMETRIAL ABLATION      LAPAROSCOPIC CHOLECYSTECTOMY      TUBAL ABDOMINAL LIGATION         Family History:   Family History   Problem Relation Age of Onset    Uterine cancer Mother     Stroke Father     Diabetes Father     Hypertension Father     Heart disease Father         Ischemic heart disease    COPD Father     Breast cancer Neg Hx        Social History:   Social History     Socioeconomic History    Marital status:    Tobacco Use    Passive exposure: Past    Smokeless tobacco: Never   Vaping Use    Vaping status: Some Days    Substances: zero nicotine    Devices: Disposable   Substance and Sexual Activity    Alcohol use: No    Drug use: No    Sexual activity: Not Currently     Partners: Male       Medications:     Current Outpatient Medications:     azelastine (ASTELIN) 0.1 % nasal spray, 1 spray into the nostril(s) as directed by provider 2 (Two) Times a Day As Needed for Rhinitis or Allergies. Use in each nostril as directed, Disp: 1 each, Rfl: 5    Blood Glucose Monitoring Suppl (ONE TOUCH ULTRA MINI) w/Device kit, , Disp: , Rfl:     cetirizine (zyrTEC) 10 MG tablet, Take 1 tablet by mouth Daily., Disp: , Rfl:     gabapentin (NEURONTIN) 800 MG tablet, take 1 tablet by mouth three times a day, Disp: , Rfl: 0    HYDROcodone-acetaminophen (NORCO)  MG per tablet, Take 1  "tablet by mouth Every 8 (Eight) Hours As Needed., Disp: , Rfl:     ipratropium-albuterol (DUO-NEB) 0.5-2.5 mg/3 ml nebulizer, Nebulize q 4 h prn wheezing / shortness of breath, Disp: 360 mL, Rfl: 0    levothyroxine (SYNTHROID, LEVOTHROID) 150 MCG tablet, TAKE 1 TABLET BY MOUTH EVERY DAY IN THE MORNING ON AN EMPTY STOMACH, Disp: , Rfl:     lisinopril-hydrochlorothiazide (PRINZIDE,ZESTORETIC) 20-12.5 MG per tablet, Take 1 tablet by mouth Daily., Disp: , Rfl:     metFORMIN (GLUCOPHAGE) 500 MG tablet, , Disp: , Rfl: 0    montelukast (SINGULAIR) 10 MG tablet, Take 1 tablet by mouth Every Night., Disp: 90 tablet, Rfl: 1    omeprazole (priLOSEC) 40 MG capsule, Take 1 capsule by mouth Daily., Disp: , Rfl: 0    ondansetron ODT (ZOFRAN-ODT) 4 MG disintegrating tablet, Take 1 tablet by mouth Every 6 (Six) Hours As Needed for Nausea., Disp: 15 tablet, Rfl: 0    ONETOUCH DELICA LANCETS 33G misc, USE AS DIRECTED TO TEST TWICE DAILY, Disp: , Rfl: 0    ONETOUCH VERIO test strip, , Disp: , Rfl: 1    simvastatin (ZOCOR) 20 MG tablet, Take 1 tablet by mouth Every Night., Disp: , Rfl: 0    Trelegy Ellipta 200-62.5-25 MCG/ACT inhaler, INHALE ONE PUFF BY MOUTH DAILY. RINSE MOUTH WITH WATER AFTER USE., Disp: 60 each, Rfl: 8    Ventolin  (90 Base) MCG/ACT inhaler, Inhale 2 puffs Every 4 (Four) Hours As Needed for Wheezing or Shortness of Air., Disp: 18 g, Rfl: 3    Allergies:   Allergies   Allergen Reactions    Ibuprofen Itching, Other (See Comments) and GI Intolerance     Chest pain      Zithromax [Azithromycin] GI Intolerance     Pt requested not to get anymore    Rocephin [Ceftriaxone] Itching and Rash       Physical Exam:  Vital Signs:   Vitals:    05/21/25 1008   BP: 102/70   BP Location: Right arm   Patient Position: Sitting   Cuff Size: Adult   Pulse: 69   Resp: 20   SpO2: 93%   Weight: 66 kg (145 lb 6.4 oz)   Height: 152.4 cm (60\")       Physical Exam  Constitutional:       General: She is not in acute distress.     " "Appearance: Normal appearance. She is well-developed. She is not diaphoretic.   HENT:      Head: Normocephalic and atraumatic.   Eyes:      General: No scleral icterus.     Pupils: Pupils are equal, round, and reactive to light.   Neck:      Trachea: No tracheal deviation.   Cardiovascular:      Rate and Rhythm: Normal rate and regular rhythm.      Heart sounds: Normal heart sounds. No murmur heard.     No friction rub. No gallop.      Comments: Normal JVD.    Pulmonary:      Effort: Pulmonary effort is normal. No respiratory distress.      Breath sounds: Normal breath sounds. No stridor. No wheezing or rales.   Chest:      Chest wall: No tenderness.   Abdominal:      General: Bowel sounds are normal. There is no distension.      Palpations: Abdomen is soft.      Tenderness: There is no abdominal tenderness. There is no guarding or rebound.   Musculoskeletal:         General: No swelling. Normal range of motion.      Cervical back: Neck supple. No tenderness.   Lymphadenopathy:      Cervical: No cervical adenopathy.   Skin:     General: Skin is warm and dry.      Findings: No erythema.   Neurological:      General: No focal deficit present.      Mental Status: She is alert and oriented to person, place, and time.   Psychiatric:         Mood and Affect: Mood normal.         Behavior: Behavior normal.         Results Review:   I reviewed the patient's new clinical results.      ECG 12 Lead    Date/Time: 5/21/2025 10:27 AM  Performed by: Chepe Fried MD    Authorized by: Chepe Fried MD  Comparison: not compared with previous ECG   Rhythm: sinus rhythm  Rate: normal  QRS axis: normal    Clinical impression: normal ECG          Assessment / Plan:     1. Syncope and collapse   -- History of rare but recurrent what appear to be syncopal events, began as acute shortness of breath with subsequent \"blacking out\"  -- Underlying etiology currently unclear, however events appear to be primary pulmonary related  -- " ECG today shows NSR with no significant conduction system abnormalities  -- Will obtain baseline echocardiogram to underlying structural valvular abnormalities  -- 14-day Holter monitor to further rule out underlying paroxysmal arrhythmia  -- Follow-up as needed, pending results of cardiac testing        Follow Up:   Return if symptoms worsen or fail to improve.      Thank you for allowing me to participate in the care of your patient. Please to not hesitate to contact me with additional questions or concerns.     PEACE Fried MD  Interventional Cardiology   05/21/2025  10:17 EDT

## 2025-05-30 ENCOUNTER — SPECIALTY PHARMACY (OUTPATIENT)
Dept: PHARMACY | Facility: HOSPITAL | Age: 60
End: 2025-05-30
Payer: COMMERCIAL

## 2025-05-30 DIAGNOSIS — J44.89 COPD WITH ASTHMA: Primary | ICD-10-CM

## 2025-06-07 DIAGNOSIS — J44.89 COPD WITH ASTHMA: ICD-10-CM

## 2025-06-09 RX ORDER — ALBUTEROL SULFATE 90 UG/1
2 AEROSOL, METERED RESPIRATORY (INHALATION) EVERY 4 HOURS PRN
Qty: 18 G | Refills: 5 | Status: SHIPPED | OUTPATIENT
Start: 2025-06-09

## 2025-06-11 ENCOUNTER — SPECIALTY PHARMACY (OUTPATIENT)
Dept: PHARMACY | Facility: HOSPITAL | Age: 60
End: 2025-06-11
Payer: COMMERCIAL

## 2025-06-11 DIAGNOSIS — J44.89 COPD WITH ASTHMA: Primary | ICD-10-CM

## 2025-06-11 RX ORDER — MEPOLIZUMAB 100 MG/ML
100 INJECTION, SOLUTION SUBCUTANEOUS
Qty: 1 ML | Refills: 0 | Status: SHIPPED | OUTPATIENT
Start: 2025-06-11

## 2025-06-11 NOTE — PROGRESS NOTES
Ambulatory Care Clinic  Specialty Pharmacy Initiation Review       Sarita Horan is a 60 y.o. YO female who has been diagnosed with COPD with Asthma and prescribed Nucala. Patient has been referred to the [facility] to receive specialty pharmacy services for medication initiation/management.    Indication, effectiveness, safety and convenience of medication reviewed today. Prior authorization approved.     PharmD will conduct initial clinical assessment and provide patient education during first clinic appointment on 6/18/25. Patient expressed understanding and all questions were answered.     Leilani Vázquez [facility]  6/11/2025  10:33 EDT

## 2025-06-18 ENCOUNTER — TELEPHONE (OUTPATIENT)
Dept: PHARMACY | Facility: HOSPITAL | Age: 60
End: 2025-06-18

## 2025-06-29 ENCOUNTER — APPOINTMENT (OUTPATIENT)
Dept: GENERAL RADIOLOGY | Facility: HOSPITAL | Age: 60
End: 2025-06-29
Payer: OTHER MISCELLANEOUS

## 2025-06-29 ENCOUNTER — APPOINTMENT (OUTPATIENT)
Dept: CT IMAGING | Facility: HOSPITAL | Age: 60
End: 2025-06-29
Payer: OTHER MISCELLANEOUS

## 2025-06-29 ENCOUNTER — HOSPITAL ENCOUNTER (EMERGENCY)
Facility: HOSPITAL | Age: 60
Discharge: HOME | End: 2025-06-29
Attending: EMERGENCY MEDICINE | Admitting: EMERGENCY MEDICINE
Payer: OTHER MISCELLANEOUS

## 2025-06-29 VITALS
HEART RATE: 78 BPM | DIASTOLIC BLOOD PRESSURE: 82 MMHG | SYSTOLIC BLOOD PRESSURE: 145 MMHG | OXYGEN SATURATION: 95 % | TEMPERATURE: 98.4 F | BODY MASS INDEX: 28.47 KG/M2 | HEIGHT: 60 IN | RESPIRATION RATE: 18 BRPM | WEIGHT: 145 LBS

## 2025-06-29 DIAGNOSIS — J44.9 CHRONIC OBSTRUCTIVE PULMONARY DISEASE, UNSPECIFIED COPD TYPE: ICD-10-CM

## 2025-06-29 DIAGNOSIS — S16.1XXA NECK STRAIN, INITIAL ENCOUNTER: Primary | ICD-10-CM

## 2025-06-29 DIAGNOSIS — I10 ELEVATED BLOOD PRESSURE READING WITH DIAGNOSIS OF HYPERTENSION: ICD-10-CM

## 2025-06-29 DIAGNOSIS — S30.1XXA CONTUSION OF ABDOMINAL WALL, INITIAL ENCOUNTER: ICD-10-CM

## 2025-06-29 DIAGNOSIS — S20.219A CONTUSION OF CHEST WALL, UNSPECIFIED LATERALITY, INITIAL ENCOUNTER: ICD-10-CM
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
## 2025-06-29 PROCEDURE — 93005 ELECTROCARDIOGRAM TRACING: CPT | Performed by: EMERGENCY MEDICINE

## 2025-06-29 PROCEDURE — 99284 EMERGENCY DEPT VISIT MOD MDM: CPT | Performed by: EMERGENCY MEDICINE

## 2025-06-29 PROCEDURE — 72040 X-RAY EXAM NECK SPINE 2-3 VW: CPT

## 2025-06-29 PROCEDURE — 74176 CT ABD & PELVIS W/O CONTRAST: CPT

## 2025-06-29 PROCEDURE — 71250 CT THORAX DX C-: CPT

## 2025-06-29 RX ORDER — ACETAMINOPHEN 500 MG
1000 TABLET ORAL EVERY 6 HOURS PRN
Qty: 30 TABLET | Refills: 0 | Status: SHIPPED | OUTPATIENT
Start: 2025-06-29

## 2025-06-29 RX ORDER — METHOCARBAMOL 500 MG/1
500 TABLET, FILM COATED ORAL 3 TIMES DAILY PRN
Qty: 15 TABLET | Refills: 0 | Status: SHIPPED | OUTPATIENT
Start: 2025-06-29

## 2025-06-29 RX ORDER — METAXALONE 800 MG/1
800 TABLET ORAL ONCE
Status: COMPLETED | OUTPATIENT
Start: 2025-06-29 | End: 2025-06-29

## 2025-06-29 RX ORDER — ACETAMINOPHEN 500 MG
1000 TABLET ORAL ONCE
Status: COMPLETED | OUTPATIENT
Start: 2025-06-29 | End: 2025-06-29

## 2025-06-29 RX ADMIN — ACETAMINOPHEN 1000 MG: 500 TABLET, FILM COATED ORAL at 21:00

## 2025-06-29 RX ADMIN — METAXALONE 800 MG: 800 TABLET ORAL at 21:00

## 2025-06-29 NOTE — Clinical Note
[facility] EMERGENCY DEPARTMENT  [address] [address]  Phone: [phone]    Sarita Horan was seen and treated in our emergency department on 6/29/2025.  She may return to work on 07/01/2025.         Thank you for choosing [facility].    Arturo Morton MD

## 2025-06-29 NOTE — Clinical Note
[facility] EMERGENCY DEPARTMENT  [address] [address]  Phone: [phone]    Sarita Horan was seen and treated in our emergency department on 6/29/2025.  She may return to work on 07/01/2025.         Thank you for choosing [facility].    Arturo Morton MD
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
## 2025-07-08 ENCOUNTER — APPOINTMENT (OUTPATIENT)
Dept: CT IMAGING | Facility: HOSPITAL | Age: 60
End: 2025-07-08
Payer: OTHER MISCELLANEOUS

## 2025-07-08 ENCOUNTER — HOSPITAL ENCOUNTER (EMERGENCY)
Facility: HOSPITAL | Age: 60
Discharge: HOME | End: 2025-07-08
Attending: STUDENT IN AN ORGANIZED HEALTH CARE EDUCATION/TRAINING PROGRAM | Admitting: STUDENT IN AN ORGANIZED HEALTH CARE EDUCATION/TRAINING PROGRAM
Payer: OTHER MISCELLANEOUS

## 2025-07-08 ENCOUNTER — APPOINTMENT (OUTPATIENT)
Dept: GENERAL RADIOLOGY | Facility: HOSPITAL | Age: 60
End: 2025-07-08
Payer: OTHER MISCELLANEOUS

## 2025-07-08 VITALS
SYSTOLIC BLOOD PRESSURE: 148 MMHG | OXYGEN SATURATION: 94 % | HEART RATE: 61 BPM | DIASTOLIC BLOOD PRESSURE: 80 MMHG | TEMPERATURE: 98.2 F | RESPIRATION RATE: 16 BRPM | HEIGHT: 60 IN | WEIGHT: 146 LBS | BODY MASS INDEX: 28.66 KG/M2

## 2025-07-08 DIAGNOSIS — R07.9 CHEST PAIN, UNSPECIFIED TYPE: Primary | ICD-10-CM

## 2025-07-08 LAB
ALBUMIN SERPL-MCNC: 4 G/DL (ref 3.5–5.2)
ALBUMIN/GLOB SERPL: 1.3 G/DL
ALP SERPL-CCNC: 101 U/L (ref 39–117)
ALT SERPL W P-5'-P-CCNC: 17 U/L (ref 1–33)
ANION GAP SERPL CALCULATED.3IONS-SCNC: 11.4 MMOL/L (ref 5–15)
AST SERPL-CCNC: 22 U/L (ref 1–32)
BASOPHILS # BLD AUTO: 0.08 10*3/MM3 (ref 0–0.2)
BASOPHILS NFR BLD AUTO: 0.8 % (ref 0–1.5)
BILIRUB SERPL-MCNC: 0.4 MG/DL (ref 0–1.2)
BUN SERPL-MCNC: 14 MG/DL (ref 8–23)
BUN/CREAT SERPL: 16.1 (ref 7–25)
CALCIUM SPEC-SCNC: 9.2 MG/DL (ref 8.6–10.5)
CHLORIDE SERPL-SCNC: 103 MMOL/L (ref 98–107)
CO2 SERPL-SCNC: 24.6 MMOL/L (ref 22–29)
CREAT SERPL-MCNC: 0.87 MG/DL (ref 0.57–1)
CRP SERPL-MCNC: 1.05 MG/DL (ref 0–0.5)
DEPRECATED RDW RBC AUTO: 47.1 FL (ref 37–54)
EGFRCR SERPLBLD CKD-EPI 2021: 76.4 ML/MIN/1.73
EOSINOPHIL # BLD AUTO: 0.3 10*3/MM3 (ref 0–0.4)
EOSINOPHIL NFR BLD AUTO: 2.9 % (ref 0.3–6.2)
ERYTHROCYTE [DISTWIDTH] IN BLOOD BY AUTOMATED COUNT: 13.2 % (ref 12.3–15.4)
FLUAV SUBTYP SPEC NAA+PROBE: NOT DETECTED
FLUBV RNA NPH QL NAA+NON-PROBE: NOT DETECTED
GEN 5 1HR TROPONIN T REFLEX: 24 NG/L
GLOBULIN UR ELPH-MCNC: 3.2 GM/DL
GLUCOSE SERPL-MCNC: 104 MG/DL (ref 65–99)
HCT VFR BLD AUTO: 38.6 % (ref 34–46.6)
HGB BLD-MCNC: 12.8 G/DL (ref 12–15.9)
IMM GRANULOCYTES # BLD AUTO: 0.02 10*3/MM3 (ref 0–0.05)
IMM GRANULOCYTES NFR BLD AUTO: 0.2 % (ref 0–0.5)
LYMPHOCYTES # BLD AUTO: 4.94 10*3/MM3 (ref 0.7–3.1)
LYMPHOCYTES NFR BLD AUTO: 47.7 % (ref 19.6–45.3)
MAGNESIUM SERPL-MCNC: 2.3 MG/DL (ref 1.6–2.4)
MCH RBC QN AUTO: 32.6 PG (ref 26.6–33)
MCHC RBC AUTO-ENTMCNC: 33.2 G/DL (ref 31.5–35.7)
MCV RBC AUTO: 98.2 FL (ref 79–97)
MONOCYTES # BLD AUTO: 0.53 10*3/MM3 (ref 0.1–0.9)
MONOCYTES NFR BLD AUTO: 5.1 % (ref 5–12)
NEUTROPHILS NFR BLD AUTO: 4.48 10*3/MM3 (ref 1.7–7)
NEUTROPHILS NFR BLD AUTO: 43.3 % (ref 42.7–76)
NRBC BLD AUTO-RTO: 0.2 /100 WBC (ref 0–0.2)
NT-PROBNP SERPL-MCNC: 72.3 PG/ML (ref 0–900)
PLATELET # BLD AUTO: 320 10*3/MM3 (ref 140–450)
PMV BLD AUTO: 9.3 FL (ref 6–12)
POTASSIUM SERPL-SCNC: 4.5 MMOL/L (ref 3.5–5.2)
PROCALCITONIN SERPL-MCNC: <0.02 NG/ML (ref 0–0.25)
PROT SERPL-MCNC: 7.2 G/DL (ref 6–8.5)
RBC # BLD AUTO: 3.93 10*6/MM3 (ref 3.77–5.28)
SARS-COV-2 RNA RESP QL NAA+PROBE: NOT DETECTED
SODIUM SERPL-SCNC: 139 MMOL/L (ref 136–145)
TROPONIN T % DELTA: -8
TROPONIN T NUMERIC DELTA: -2 NG/L
TROPONIN T SERPL HS-MCNC: 26 NG/L
WBC NRBC COR # BLD AUTO: 10.35 10*3/MM3 (ref 3.4–10.8)

## 2025-07-08 PROCEDURE — 84145 PROCALCITONIN (PCT): CPT | Performed by: STUDENT IN AN ORGANIZED HEALTH CARE EDUCATION/TRAINING PROGRAM

## 2025-07-08 PROCEDURE — 83735 ASSAY OF MAGNESIUM: CPT | Performed by: STUDENT IN AN ORGANIZED HEALTH CARE EDUCATION/TRAINING PROGRAM

## 2025-07-08 PROCEDURE — 83880 ASSAY OF NATRIURETIC PEPTIDE: CPT | Performed by: STUDENT IN AN ORGANIZED HEALTH CARE EDUCATION/TRAINING PROGRAM

## 2025-07-08 PROCEDURE — 87636 SARSCOV2 & INF A&B AMP PRB: CPT | Performed by: STUDENT IN AN ORGANIZED HEALTH CARE EDUCATION/TRAINING PROGRAM

## 2025-07-08 PROCEDURE — 86140 C-REACTIVE PROTEIN: CPT | Performed by: STUDENT IN AN ORGANIZED HEALTH CARE EDUCATION/TRAINING PROGRAM

## 2025-07-08 PROCEDURE — 93005 ELECTROCARDIOGRAM TRACING: CPT | Performed by: STUDENT IN AN ORGANIZED HEALTH CARE EDUCATION/TRAINING PROGRAM

## 2025-07-08 PROCEDURE — 36415 COLL VENOUS BLD VENIPUNCTURE: CPT

## 2025-07-08 PROCEDURE — 99285 EMERGENCY DEPT VISIT HI MDM: CPT | Performed by: STUDENT IN AN ORGANIZED HEALTH CARE EDUCATION/TRAINING PROGRAM

## 2025-07-08 PROCEDURE — 80053 COMPREHEN METABOLIC PANEL: CPT | Performed by: STUDENT IN AN ORGANIZED HEALTH CARE EDUCATION/TRAINING PROGRAM

## 2025-07-08 PROCEDURE — 84484 ASSAY OF TROPONIN QUANT: CPT | Performed by: STUDENT IN AN ORGANIZED HEALTH CARE EDUCATION/TRAINING PROGRAM

## 2025-07-08 PROCEDURE — 71275 CT ANGIOGRAPHY CHEST: CPT

## 2025-07-08 PROCEDURE — 85025 COMPLETE CBC W/AUTO DIFF WBC: CPT | Performed by: STUDENT IN AN ORGANIZED HEALTH CARE EDUCATION/TRAINING PROGRAM

## 2025-07-08 PROCEDURE — 71045 X-RAY EXAM CHEST 1 VIEW: CPT

## 2025-07-08 PROCEDURE — 25510000001 IOPAMIDOL 61 % SOLUTION: Performed by: STUDENT IN AN ORGANIZED HEALTH CARE EDUCATION/TRAINING PROGRAM

## 2025-07-08 RX ORDER — IOPAMIDOL 612 MG/ML
100 INJECTION, SOLUTION INTRAVASCULAR
Status: COMPLETED | OUTPATIENT
Start: 2025-07-08 | End: 2025-07-08

## 2025-07-08 RX ADMIN — IOPAMIDOL 100 ML: 612 INJECTION, SOLUTION INTRAVENOUS at 17:20
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
## 2025-07-08 NOTE — Clinical Note
[facility] EMERGENCY DEPARTMENT  [address] [address]  Phone: [phone]    Sarita Horan was seen and treated in our emergency department on 7/8/2025.  She may return to work on 07/11/2025.         Thank you for choosing [facility].    Benjy Alcala MD

## 2025-07-08 NOTE — Clinical Note
[facility] EMERGENCY DEPARTMENT  [address] [address]  Phone: [phone]    Sarita Horan was seen and treated in our emergency department on 7/8/2025.  She may return to work on 07/11/2025.         Thank you for choosing [facility].    Benjy Alcala MD
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
## 2025-07-22 ENCOUNTER — SPECIALTY PHARMACY (OUTPATIENT)
Dept: PHARMACY | Facility: HOSPITAL | Age: 60
End: 2025-07-22
Payer: OTHER MISCELLANEOUS

## 2025-07-22 DIAGNOSIS — J44.89 COPD WITH ASTHMA: Primary | ICD-10-CM

## 2025-07-22 NOTE — PROGRESS NOTES
"Called patient regarding three separate cancel/reschedule appointments and one\"no show\"  appointment for Nucala initiation. She stated that she spoke with her PCP LEX Palacio and she had recommended that she not start Nucula because it can weaken her immune system. Informed patient that Nucala rarely if any causes a weakened immune system. She will further discuss with LEX Machado at her follow-up appointment on 9/29/25. Will disenroll patient from specialty program at this time.    Leilani Vázquez, PharmROBERT  7/22/2025   11:21 EDT    "

## 2025-08-05 ENCOUNTER — TRANSCRIBE ORDERS (OUTPATIENT)
Dept: ADMINISTRATIVE | Facility: HOSPITAL | Age: 60
End: 2025-08-05
Payer: OTHER MISCELLANEOUS

## 2025-08-05 DIAGNOSIS — E27.8 ADRENAL MASS: Primary | ICD-10-CM
